# Patient Record
Sex: FEMALE | Race: WHITE | Employment: UNEMPLOYED | URBAN - METROPOLITAN AREA
[De-identification: names, ages, dates, MRNs, and addresses within clinical notes are randomized per-mention and may not be internally consistent; named-entity substitution may affect disease eponyms.]

---

## 2017-03-16 ENCOUNTER — DOCUMENTATION ONLY (OUTPATIENT)
Dept: FAMILY MEDICINE CLINIC | Age: 42
End: 2017-03-16

## 2017-04-17 DIAGNOSIS — E03.4 HYPOTHYROIDISM DUE TO ACQUIRED ATROPHY OF THYROID: ICD-10-CM

## 2017-04-17 DIAGNOSIS — E78.00 HYPERCHOLESTEREMIA: ICD-10-CM

## 2017-04-17 DIAGNOSIS — I10 ESSENTIAL HYPERTENSION WITH GOAL BLOOD PRESSURE LESS THAN 140/90: ICD-10-CM

## 2017-04-17 RX ORDER — SIMVASTATIN 40 MG/1
TABLET, FILM COATED ORAL
Qty: 30 TAB | Refills: 3 | Status: SHIPPED | OUTPATIENT
Start: 2017-04-17 | End: 2017-08-23 | Stop reason: SDUPTHER

## 2017-04-17 RX ORDER — LOSARTAN POTASSIUM 50 MG/1
TABLET ORAL
Qty: 30 TAB | Refills: 3 | Status: SHIPPED | OUTPATIENT
Start: 2017-04-17 | End: 2017-08-23 | Stop reason: SDUPTHER

## 2017-04-17 RX ORDER — LEVOTHYROXINE SODIUM 50 UG/1
TABLET ORAL
Qty: 30 TAB | Refills: 3 | Status: SHIPPED | OUTPATIENT
Start: 2017-04-17 | End: 2017-08-23 | Stop reason: SDUPTHER

## 2017-04-17 RX ORDER — HYDROCHLOROTHIAZIDE 25 MG/1
TABLET ORAL
Qty: 30 TAB | Refills: 3 | Status: SHIPPED | OUTPATIENT
Start: 2017-04-17 | End: 2017-08-23 | Stop reason: SDUPTHER

## 2017-05-12 RX ORDER — POLYETHYLENE GLYCOL 3350 17 G/17G
POWDER, FOR SOLUTION ORAL
Qty: 850 G | Refills: 3 | Status: SHIPPED | OUTPATIENT
Start: 2017-05-12 | End: 2017-08-23 | Stop reason: SDUPTHER

## 2017-06-16 DIAGNOSIS — E66.01 MORBID OBESITY DUE TO EXCESS CALORIES (HCC): ICD-10-CM

## 2017-06-16 RX ORDER — METFORMIN HYDROCHLORIDE 500 MG/1
TABLET ORAL
Qty: 60 TAB | Refills: 4 | Status: SHIPPED | OUTPATIENT
Start: 2017-06-16 | End: 2017-08-23 | Stop reason: SDUPTHER

## 2017-08-23 ENCOUNTER — HOSPITAL ENCOUNTER (OUTPATIENT)
Dept: LAB | Age: 42
Discharge: HOME | End: 2017-08-23
Payer: MEDICARE

## 2017-08-23 ENCOUNTER — OFFICE VISIT (OUTPATIENT)
Dept: FAMILY MEDICINE CLINIC | Age: 42
End: 2017-08-23

## 2017-08-23 VITALS
BODY MASS INDEX: 33.81 KG/M2 | SYSTOLIC BLOOD PRESSURE: 161 MMHG | WEIGHT: 215.4 LBS | DIASTOLIC BLOOD PRESSURE: 95 MMHG | HEIGHT: 67 IN | RESPIRATION RATE: 15 BRPM | TEMPERATURE: 99.9 F | HEART RATE: 115 BPM | OXYGEN SATURATION: 96 %

## 2017-08-23 DIAGNOSIS — D68.9 COAGULATION DEFECT (HCC): ICD-10-CM

## 2017-08-23 DIAGNOSIS — R21 RASH: ICD-10-CM

## 2017-08-23 DIAGNOSIS — E11.9 DM TYPE 2, GOAL HBA1C < 7% (HCC): Primary | ICD-10-CM

## 2017-08-23 DIAGNOSIS — I10 ESSENTIAL HYPERTENSION WITH GOAL BLOOD PRESSURE LESS THAN 140/90: ICD-10-CM

## 2017-08-23 DIAGNOSIS — E11.9 DM TYPE 2, GOAL HBA1C < 7% (HCC): ICD-10-CM

## 2017-08-23 DIAGNOSIS — E78.00 HYPERCHOLESTEREMIA: ICD-10-CM

## 2017-08-23 DIAGNOSIS — R58 BLEEDING: ICD-10-CM

## 2017-08-23 DIAGNOSIS — E03.4 HYPOTHYROIDISM DUE TO ACQUIRED ATROPHY OF THYROID: ICD-10-CM

## 2017-08-23 DIAGNOSIS — R23.3 EASY BRUISABILITY: ICD-10-CM

## 2017-08-23 DIAGNOSIS — Z12.39 BREAST CANCER SCREENING: ICD-10-CM

## 2017-08-23 DIAGNOSIS — L29.9 ITCHING: ICD-10-CM

## 2017-08-23 DIAGNOSIS — Z12.31 ENCOUNTER FOR SCREENING MAMMOGRAM FOR BREAST CANCER: ICD-10-CM

## 2017-08-23 DIAGNOSIS — I10 ESSENTIAL HYPERTENSION: ICD-10-CM

## 2017-08-23 DIAGNOSIS — K21.9 GASTROESOPHAGEAL REFLUX DISEASE WITHOUT ESOPHAGITIS: ICD-10-CM

## 2017-08-23 DIAGNOSIS — E87.6 HYPOKALEMIA: ICD-10-CM

## 2017-08-23 DIAGNOSIS — E66.01 MORBID OBESITY DUE TO EXCESS CALORIES (HCC): ICD-10-CM

## 2017-08-23 LAB
ALBUMIN SERPL-MCNC: 4.1 G/DL (ref 3.4–5)
ALBUMIN/GLOB SERPL: 1 {RATIO} (ref 0.8–1.7)
ALP SERPL-CCNC: 113 U/L (ref 45–117)
ALT SERPL-CCNC: 36 U/L (ref 13–56)
ANION GAP SERPL CALC-SCNC: 10 MMOL/L (ref 3–18)
APTT PPP: 27 SEC (ref 23–36.4)
AST SERPL-CCNC: 28 U/L (ref 15–37)
BASOPHILS # BLD: 0.1 K/UL (ref 0–0.06)
BASOPHILS NFR BLD: 0 % (ref 0–2)
BILIRUB SERPL-MCNC: 0.3 MG/DL (ref 0.2–1)
BUN SERPL-MCNC: 18 MG/DL (ref 7–18)
BUN/CREAT SERPL: 18 (ref 12–20)
CALCIUM SERPL-MCNC: 9.1 MG/DL (ref 8.5–10.1)
CHLORIDE SERPL-SCNC: 99 MMOL/L (ref 100–108)
CHOLEST SERPL-MCNC: 175 MG/DL
CO2 SERPL-SCNC: 29 MMOL/L (ref 21–32)
CREAT SERPL-MCNC: 1 MG/DL (ref 0.6–1.3)
DIFFERENTIAL METHOD BLD: ABNORMAL
EOSINOPHIL # BLD: 0.1 K/UL (ref 0–0.4)
EOSINOPHIL NFR BLD: 1 % (ref 0–5)
ERYTHROCYTE [DISTWIDTH] IN BLOOD BY AUTOMATED COUNT: 14.4 % (ref 11.6–14.5)
EST. AVERAGE GLUCOSE BLD GHB EST-MCNC: 126 MG/DL
GLOBULIN SER CALC-MCNC: 4.2 G/DL (ref 2–4)
GLUCOSE SERPL-MCNC: 107 MG/DL (ref 74–99)
HBA1C MFR BLD: 6 % (ref 4.2–5.6)
HCT VFR BLD AUTO: 43.3 % (ref 35–45)
HDLC SERPL-MCNC: 59 MG/DL (ref 40–60)
HDLC SERPL: 3 {RATIO} (ref 0–5)
HGB BLD-MCNC: 14.1 G/DL (ref 12–16)
INR PPP: 1 (ref 0.8–1.2)
LDLC SERPL CALC-MCNC: 84.8 MG/DL (ref 0–100)
LIPID PROFILE,FLP: ABNORMAL
LYMPHOCYTES # BLD: 5.2 K/UL (ref 0.9–3.6)
LYMPHOCYTES NFR BLD: 41 % (ref 21–52)
MCH RBC QN AUTO: 28.3 PG (ref 24–34)
MCHC RBC AUTO-ENTMCNC: 32.6 G/DL (ref 31–37)
MCV RBC AUTO: 86.8 FL (ref 74–97)
MONOCYTES # BLD: 1.3 K/UL (ref 0.05–1.2)
MONOCYTES NFR BLD: 10 % (ref 3–10)
NEUTS SEG # BLD: 6.2 K/UL (ref 1.8–8)
NEUTS SEG NFR BLD: 48 % (ref 40–73)
PLATELET # BLD AUTO: 292 K/UL (ref 135–420)
PMV BLD AUTO: 10.2 FL (ref 9.2–11.8)
POTASSIUM SERPL-SCNC: 3.4 MMOL/L (ref 3.5–5.5)
PROT SERPL-MCNC: 8.3 G/DL (ref 6.4–8.2)
PROTHROMBIN TIME: 12.4 SEC (ref 11.5–15.2)
RBC # BLD AUTO: 4.99 M/UL (ref 4.2–5.3)
SODIUM SERPL-SCNC: 138 MMOL/L (ref 136–145)
TRIGL SERPL-MCNC: 156 MG/DL (ref ?–150)
TSH SERPL DL<=0.05 MIU/L-ACNC: 2.17 UIU/ML (ref 0.36–3.74)
VLDLC SERPL CALC-MCNC: 31.2 MG/DL
WBC # BLD AUTO: 12.7 K/UL (ref 4.6–13.2)

## 2017-08-23 PROCEDURE — 80053 COMPREHEN METABOLIC PANEL: CPT | Performed by: INTERNAL MEDICINE

## 2017-08-23 PROCEDURE — 83036 HEMOGLOBIN GLYCOSYLATED A1C: CPT | Performed by: INTERNAL MEDICINE

## 2017-08-23 PROCEDURE — 80061 LIPID PANEL: CPT | Performed by: INTERNAL MEDICINE

## 2017-08-23 PROCEDURE — 85730 THROMBOPLASTIN TIME PARTIAL: CPT | Performed by: INTERNAL MEDICINE

## 2017-08-23 PROCEDURE — 36415 COLL VENOUS BLD VENIPUNCTURE: CPT | Performed by: INTERNAL MEDICINE

## 2017-08-23 PROCEDURE — 85610 PROTHROMBIN TIME: CPT | Performed by: INTERNAL MEDICINE

## 2017-08-23 PROCEDURE — 84443 ASSAY THYROID STIM HORMONE: CPT | Performed by: INTERNAL MEDICINE

## 2017-08-23 PROCEDURE — 86757 RICKETTSIA ANTIBODY: CPT | Performed by: INTERNAL MEDICINE

## 2017-08-23 PROCEDURE — 85025 COMPLETE CBC W/AUTO DIFF WBC: CPT | Performed by: INTERNAL MEDICINE

## 2017-08-23 RX ORDER — SIMVASTATIN 40 MG/1
40 TABLET, FILM COATED ORAL
Qty: 30 TAB | Refills: 3 | Status: SHIPPED | OUTPATIENT
Start: 2017-08-23 | End: 2017-12-13 | Stop reason: SDUPTHER

## 2017-08-23 RX ORDER — POTASSIUM CHLORIDE 750 MG/1
10 TABLET, EXTENDED RELEASE ORAL DAILY
Qty: 30 TAB | Refills: 0 | Status: SHIPPED | OUTPATIENT
Start: 2017-08-23 | End: 2017-08-25 | Stop reason: DRUGHIGH

## 2017-08-23 RX ORDER — POLYETHYLENE GLYCOL 3350 17 G/17G
17 POWDER, FOR SOLUTION ORAL DAILY
Qty: 850 G | Refills: 3 | Status: SHIPPED | OUTPATIENT
Start: 2017-08-23 | End: 2018-02-06 | Stop reason: SDUPTHER

## 2017-08-23 RX ORDER — HYDROCHLOROTHIAZIDE 25 MG/1
25 TABLET ORAL DAILY
Qty: 30 TAB | Refills: 3 | Status: SHIPPED | OUTPATIENT
Start: 2017-08-23 | End: 2017-12-13 | Stop reason: SDUPTHER

## 2017-08-23 RX ORDER — PREDNISONE 10 MG/1
TABLET ORAL
Qty: 21 TAB | Refills: 0 | Status: SHIPPED | OUTPATIENT
Start: 2017-08-23 | End: 2017-08-30 | Stop reason: SDUPTHER

## 2017-08-23 RX ORDER — PHENOL/SODIUM PHENOLATE
20 AEROSOL, SPRAY (ML) MUCOUS MEMBRANE 2 TIMES DAILY
Qty: 60 TAB | Refills: 3 | Status: SHIPPED | OUTPATIENT
Start: 2017-08-23 | End: 2018-02-06 | Stop reason: SDUPTHER

## 2017-08-23 RX ORDER — DOXYCYCLINE 100 MG/1
100 CAPSULE ORAL 2 TIMES DAILY
Qty: 20 CAP | Refills: 0 | Status: SHIPPED | OUTPATIENT
Start: 2017-08-23 | End: 2019-03-08

## 2017-08-23 RX ORDER — METFORMIN HYDROCHLORIDE 500 MG/1
500 TABLET ORAL 2 TIMES DAILY WITH MEALS
Qty: 60 TAB | Refills: 4 | Status: SHIPPED | OUTPATIENT
Start: 2017-08-23 | End: 2018-02-06 | Stop reason: SDUPTHER

## 2017-08-23 RX ORDER — HYDROXYZINE 25 MG/1
25 TABLET, FILM COATED ORAL
Qty: 30 TAB | Refills: 0 | Status: SHIPPED | OUTPATIENT
Start: 2017-08-23 | End: 2017-09-02

## 2017-08-23 RX ORDER — LEVOTHYROXINE SODIUM 50 UG/1
50 TABLET ORAL
Qty: 30 TAB | Refills: 3 | Status: SHIPPED | OUTPATIENT
Start: 2017-08-23 | End: 2017-12-13 | Stop reason: SDUPTHER

## 2017-08-23 RX ORDER — LOSARTAN POTASSIUM 50 MG/1
50 TABLET ORAL DAILY
Qty: 30 TAB | Refills: 3 | Status: SHIPPED | OUTPATIENT
Start: 2017-08-23 | End: 2017-09-12 | Stop reason: SDUPTHER

## 2017-08-23 NOTE — MR AVS SNAPSHOT
Visit Information Date & Time Provider Department Dept. Phone Encounter #  
 8/23/2017  2:45 PM Madhuri Montemayor, [address] [facility]  Follow-up Instructions Return in about 1 month (around 9/23/2017). Upcoming Health Maintenance Date Due Pneumococcal 19-64 Medium Risk (1 of 1 - PPSV23) 9/2/1993 INFLUENZA AGE 9 TO ADULT 8/1/2017 PAP AKA CERVICAL CYTOLOGY 8/12/2018 DTaP/Tdap/Td series (2 - Td) 7/1/2024 Allergies as of 8/23/2017  Review Complete On: 8/23/2017 By: Madhuri Montemayor MD  
  
 Severity Noted Reaction Type Reactions Methadone  11/30/2012    Other (comments) Hullucinations,spacy feeling Morphine  11/21/2011    Other (comments) Mood swings,suicidal thoughts Pcn [Penicillins]    Rash Tramadol  11/21/2011    Rash And suicidal thoughts Current Immunizations  Reviewed on 7/1/2014 Name Date Tdap 7/1/2014  2:22 PM  
  
 Not reviewed this visit You Were Diagnosed With   
  
 Codes Comments DM type 2, goal HbA1c < 7% (HCC)    -  Primary ICD-10-CM: E11.9 ICD-9-CM: 250.00 Hypothyroidism due to acquired atrophy of thyroid     ICD-10-CM: E03.4 ICD-9-CM: 244.8, 246.8 Hypercholesteremia     ICD-10-CM: E78.00 ICD-9-CM: 272.0 Essential hypertension     ICD-10-CM: I10 
ICD-9-CM: 401.9 Breast cancer screening     ICD-10-CM: Z12.39 
ICD-9-CM: V76.10 Encounter for screening mammogram for breast cancer     ICD-10-CM: Z12.31 
ICD-9-CM: V76.12 Morbid obesity due to excess calories (HCC)     ICD-10-CM: E66.01 
ICD-9-CM: 278.01 Essential hypertension with goal blood pressure less than 140/90     ICD-10-CM: I10 
ICD-9-CM: 401.9 Gastroesophageal reflux disease without esophagitis     ICD-10-CM: K21.9 ICD-9-CM: 530.81 Hypokalemia     ICD-10-CM: E87.6 ICD-9-CM: 276.8 Easy bruisability     ICD-10-CM: R23.8 ICD-9-CM: 782.9 Rash     ICD-10-CM: R21 
ICD-9-CM: 782.1 Itching     ICD-10-CM: L29.9 ICD-9-CM: 698.9 Bleeding     ICD-10-CM: R58 
ICD-9-CM: 459.0 Coagulation defect ([address]     ICD-10-CM: D68.9 ICD-9-CM: 286. 9 Vitals BP Pulse Temp Resp Height(growth percentile) Weight(growth percentile) (!) 161/95 (!) 115 99.9 °F (37.7 °C) (Oral) 15 5' 7\" (1.702 m) 215 lb 6.4 oz (97.7 kg) LMP SpO2 BMI OB Status Smoking Status 08/13/2017 96% 33.74 kg/m2 Having regular periods Current Every Day Smoker Vitals History BMI and BSA Data Body Mass Index Body Surface Area 33.74 kg/m 2 2.15 m 2 Preferred Pharmacy Pharmacy Name Phone [address] [address] [address] [place], [place] [phone] Your Updated Medication List  
  
   
This list is accurate as of: 8/23/17  3:46 PM.  Always use your most recent med list.  
  
  
  
  
 amitriptyline 150 mg tablet Commonly known as:  ELAVIL Take 150 mg by mouth nightly. doxycycline 100 mg capsule Commonly known as:  Arlyss Mantel Take 1 Cap by mouth two (2) times a day. ergocalciferol 50,000 unit capsule Commonly known as:  ERGOCALCIFEROL Take 1 Cap by mouth two (2) times a week for 30 days. Indications: VITAMIN D DEFICIENCY  
  
 fluticasone 50 mcg/actuation nasal spray Commonly known as:  Yecenia Yepez 2 Sprays by Both Nostrils route daily. * hydroCHLOROthiazide 25 mg tablet Commonly known as:  HYDRODIURIL Take 1 Tab by mouth daily for 30 days. Indications: HYPERTENSION  
  
 * hydroCHLOROthiazide 25 mg tablet Commonly known as:  HYDRODIURIL Take 1 Tab by mouth daily. * HYDROmorphone 8 mg tablet Commonly known as:  DILAUDID Take 8 mg by mouth every four (4) hours as needed for Pain. * HYDROmorphone 8 mg tablet Commonly known as:  DILAUDID Take 1.5 Tabs by mouth four (4) times daily as needed for Pain for 30 days. FILL ON OR AFTER: 10/18/13  Indications: PAIN  
  
 * HYDROmorphone 8 mg tablet Commonly known as:  DILAUDID Take 1.5 tablets by mouth four (4) times daily as needed for Pain for up to 30 days. Indications: PAIN  
  
 * HYDROmorphone 8 mg tablet Commonly known as:  DILAUDID Take 1.5 tablets by mouth four (4) times daily as needed for Pain for up to 30 days. Indications: PAIN  
  
 * HYDROmorphone 8 mg tablet Commonly known as:  DILAUDID Take 1.5 Tabs by mouth four (4) times daily as needed for Pain for up to 30 days. Indications: PAIN  
  
 * HYDROmorphone 8 mg tablet Commonly known as:  DILAUDID Take 1.5 Tabs by mouth four (4) times daily as needed for Pain for up to 30 days. Indications: PAIN  
  
 hydrOXYzine HCl 25 mg tablet Commonly known as:  ATARAX Take 1 Tab by mouth three (3) times daily as needed for Itching for up to 10 days. KlonoPIN 1 mg tablet Generic drug:  clonazePAM  
Take 1 mg by mouth four (4) times daily. LaMICtal 200 mg tablet Generic drug:  lamoTRIgine Take 200 mg by mouth daily. levothyroxine 50 mcg tablet Commonly known as:  SYNTHROID Take 1 Tab by mouth Daily (before breakfast). LINZESS 145 mcg Cap capsule Generic drug:  linaclotide Take  by mouth Daily (before breakfast). lorcaserin 10 mg Tab Commonly known as:  [place] Feast Take 10 mg by mouth two (2) times a day. Max Daily Amount: 20 mg.  
  
 losartan 50 mg tablet Commonly known as:  COZAAR Take 1 Tab by mouth daily. mefenamic acid 250 mg capsule Commonly known as:  PONSTEL Take 1 Cap by mouth every six (6) hours. metFORMIN 500 mg tablet Commonly known as:  GLUCOPHAGE Take 1 Tab by mouth two (2) times daily (with meals). Omeprazole delayed release 20 mg tablet Commonly known as:  PRILOSEC D/R Take 1 Tab by mouth two (2) times a day. * oxyCODONE CR 40 mg CR tablet Commonly known as:  OxyCONTIN Take 1 Tab by mouth every six (6) hours for 30 days.  For chronic pain Fill on or after: 4/11/14  Indications: NEUROPATHIC PAIN, PAIN  
  
 * oxyCODONE CR 40 mg CR tablet Commonly known as:  OxyCONTIN Take 1 Tab by mouth every six (6) hours for 30 days. For chronic pain Fill on or after: 5/9/14  Indications: NEUROPATHIC PAIN, PAIN  
  
 * oxyCODONE CR 40 mg CR tablet Commonly known as:  OxyCONTIN Take 1 Tab by mouth every six (6) hours for 30 days. For chronic pain Fill on or after: 6/7/14  Indications: NEUROPATHIC PAIN, PAIN  
  
 * oxyCODONE CR 40 mg CR tablet Commonly known as:  OxyCONTIN Take 1 Tab by mouth every six (6) hours for 30 days. For chronic pain Fill on or after: 7/5/14  Indications: CHRONIC PAIN, SEVERE PAIN  
  
 * oxyCODONE CR 40 mg CR tablet Commonly known as:  OxyCONTIN Take 1 Tab by mouth every six (6) hours for 30 days. For chronic pain Fill on or after: 8/2/14  Indications: CHRONIC PAIN, SEVERE PAIN  
  
 * oxyCODONE CR 40 mg CR tablet Commonly known as:  OxyCONTIN Take 1 Tab by mouth every six (6) hours for 30 days. For chronic pain Fill on or after: 9/1/14  Indications: CHRONIC PAIN, SEVERE PAIN  
  
 * oxyCODONE CR 60 mg CR tablet Commonly known as:  OxyCONTIN Take 1 tablet by mouth every six (6) hours for 30 days. Indications: CHRONIC PAIN WITH OPIOID TOLERANCE, SEVERE PAIN WITH OPIOID TOLERANCE  
  
 * oxyCODONE CR 60 mg CR tablet Commonly known as:  OxyCONTIN Take 1 tablet by mouth every six (6) hours for 30 days. For chronic pain  Indications: CHRONIC PAIN WITH OPIOID TOLERANCE, SEVERE PAIN WITH OPIOID TOLERANCE  
  
 * oxyCODONE CR 60 mg CR tablet Commonly known as:  OxyCONTIN Take 1 tablet by mouth every six (6) hours for 30 days. Indications: CHRONIC PAIN WITH OPIOID TOLERANCE, SEVERE PAIN WITH OPIOID TOLERANCE  
  
 * oxyCODONE IR 30 mg immediate release tablet Commonly known as:  OXY-IR Take 1 Tab by mouth four (4) times daily as needed for Pain. Max Daily Amount: 120 mg.  
  
 * oxyCODONE CR 60 mg CR tablet Commonly known as:  OxyCONTIN Take 1 Tab by mouth every six (6) hours for 30 days. Indications: CHRONIC PAIN WITH OPIOID TOLERANCE, SEVERE PAIN WITH OPIOID TOLERANCE  
  
 * oxyCODONE IR 15 mg immediate release tablet Commonly known as:  OXY-IR  
  
 * OxyCONTIN 20 mg ER tablet Generic drug:  oxyCODONE ER  
  
 polyethylene glycol 17 gram/dose powder Commonly known as:  Almeta Lauth Take 17 g by mouth daily. potassium chloride 10 mEq tablet Commonly known as:  KLOR-CON Take 1 Tab by mouth daily. predniSONE 10 mg dose pack Commonly known as:  STERAPRED DS See administration instruction per 10mg dose pack  
  
 simvastatin 40 mg tablet Commonly known as:  ZOCOR Take 1 Tab by mouth nightly. SUMAtriptan 50 mg tablet Commonly known as:  IMITREX Take 1 Tab by mouth once as needed for Migraine for up to 1 dose. TRAZODONE PO Take 30 mg by mouth nightly as needed. WELLBUTRIN  mg SR tablet Generic drug:  buPROPion SR Take 400 mg by mouth daily. * Notice: This list has 21 medication(s) that are the same as other medications prescribed for you. Read the directions carefully, and ask your doctor or other care provider to review them with you. Prescriptions Sent to Pharmacy Refills  
 metFORMIN (GLUCOPHAGE) 500 mg tablet 4 Sig: Take 1 Tab by mouth two (2) times daily (with meals). Class: Normal  
 Pharmacy: [address] [place] [address] Ph #: [phone] Route: Oral  
 levothyroxine (SYNTHROID) 50 mcg tablet 3 Sig: Take 1 Tab by mouth Daily (before breakfast). Class: Normal  
 Pharmacy: [address] [place] [address] Ph #: [phone] Route: Oral  
 losartan (COZAAR) 50 mg tablet 3 Sig: Take 1 Tab by mouth daily. Class: Normal  
 Pharmacy: [address] [place] [address] Ph #: [phone] Route: Oral  
 Omeprazole delayed release (PRILOSEC D/R) 20 mg tablet 3 Sig: Take 1 Tab by mouth two (2) times a day. Class: Normal  
 Pharmacy: [address] [place] [address] Ph #: [phone] Route: Oral  
 polyethylene glycol (MIRALAX) 17 gram/dose powder 3 Sig: Take 17 g by mouth daily. Class: Normal  
 Pharmacy: [address] [place] [address] Ph #: [phone] Route: Oral  
 hydroCHLOROthiazide (HYDRODIURIL) 25 mg tablet 3 Sig: Take 1 Tab by mouth daily. Class: Normal  
 Pharmacy: [address] [place] [address] Ph #: [phone] Route: Oral  
 simvastatin (ZOCOR) 40 mg tablet 3 Sig: Take 1 Tab by mouth nightly. Class: Normal  
 Pharmacy: [address] [place] [address] Ph #: [phone] Route: Oral  
 potassium chloride (KLOR-CON) 10 mEq tablet 0 Sig: Take 1 Tab by mouth daily. Class: Normal  
 Pharmacy: [address] [place] [address] Ph #: [phone] Route: Oral  
 hydrOXYzine HCl (ATARAX) 25 mg tablet 0 Sig: Take 1 Tab by mouth three (3) times daily as needed for Itching for up to 10 days. Class: Normal  
 Pharmacy: [address] [place] [address] Ph #: [phone] Route: Oral  
 predniSONE (STERAPRED DS) 10 mg dose pack 0 Sig: See administration instruction per 10mg dose pack Class: Normal  
 Pharmacy: [address] [place] [address] Ph #: [phone]  
 doxycycline (MONODOX) 100 mg capsule 0 Sig: Take 1 Cap by mouth two (2) times a day. Class: Normal  
 Pharmacy: [address] [place] [address] Ph #: [phone] Route: Oral  
  
Follow-up Instructions Return in about 1 month (around 9/23/2017). To-Do List   
 08/23/2017 Lab:  CBC WITH AUTOMATED DIFF   
  
 08/23/2017 Lab:  HEMOGLOBIN A1C WITH EAG   
  
 08/23/2017 Lab:  LIPID PANEL   
  
 08/23/2017 Imaging:  BENJI MAMMO BI SCREENING INCL CAD   
  
 08/23/2017 Lab:  METABOLIC PANEL, COMPREHENSIVE   
  
 08/23/2017 Lab:  MICROALBUMIN, UR, RAND W/ MICROALBUMIN/CREA RATIO   
  
 08/23/2017 Lab:  PROTHROMBIN TIME + INR   
  
 08/23/2017 Lab:  PTT   
  
 08/23/2017 Lab:  R RICKETTSII AB IGG W/REFL   
  
 08/23/2017 Lab:  TSH 3RD GENERATION Introducing [facility]! [employer] introduces [employer] patient portal. Now you can access parts of your medical record, email your doctor's office, and request medication refills online. 1. In your internet browser, go to [email or web address] 2. Click on the First Time User? Click Here link in the Sign In box. You will see the New Member Sign Up page. 3. Enter your [employer] Access Code exactly as it appears below. You will not need to use this code after youve completed the sign-up process. If you do not sign up before the expiration date, you must request a new code. · [employer] Access Code: [email or web address] Expires: 11/21/2017  2:43 PM 
 
4. Enter the last four digits of your Social Security Number (xxxx) and Date of Birth (mm/dd/yyyy) as indicated and click Submit. You will be taken to the next sign-up page. 5. Create a [employer] ID. This will be your [employer] login ID and cannot be changed, so think of one that is secure and easy to remember. 6. Create a [employer] password. You can change your password at any time. 7. Enter your Password Reset Question and Answer. This can be used at a later time if you forget your password. 8. Enter your e-mail address. You will receive e-mail notification when new information is available in [address] 9. Click Sign Up. You can now view and download portions of your medical record. 10. Click the Download Summary menu link to download a portable copy of your medical information. If you have questions, please visit the Frequently Asked Questions section of the [employer] website. Remember, [employer] is NOT to be used for urgent needs. For medical emergencies, dial 911. Now available from your iPhone and Android! Please provide this summary of care documentation to your next provider. Your primary care clinician is listed as Charles Garcia. If you have any questions after today's visit, please call [phone].

## 2017-08-23 NOTE — PROGRESS NOTES
Mika Candelaria is a 42 y.o.  female and presents with     Chief Complaint   Patient presents with    Hypertension    Hypothyroidism    Diabetes    Fever    Rash    Itching     Pt is in process of moving. She tried some clothing out of a box. She broke out in a rash on arms and legs. Pt went to [facility] urgent care. Pt was treated for scabies and also given prednisone. Pt says the rash appeared abrubtly overnight. Pt has low grade fever of 99. Pt denies urinary symptoms, cough or dental issues, no abdominal pain. Pt has been trying to loose wt. Pt has lost 4 pounds. Pt bruises easily      Past Medical History:   Diagnosis Date    ADD (attention deficit disorder)     Chronic pain     back    DDD (degenerative disc disease)     Depression     Hypercholesterolemia     Hypertension     Psychotic disorder     Reflex sympathetic dystrophy of the lower limb 7/27/2011    RSD (reflex sympathetic dystrophy)     RSD (reflex sympathetic dystrophy)     Sacralization     L5    Sprained ankle     Thyroid disease      No past surgical history on file. Current Outpatient Prescriptions   Medication Sig    metFORMIN (GLUCOPHAGE) 500 mg tablet Take 1 Tab by mouth two (2) times daily (with meals).  levothyroxine (SYNTHROID) 50 mcg tablet Take 1 Tab by mouth Daily (before breakfast).  losartan (COZAAR) 50 mg tablet Take 1 Tab by mouth daily.  Omeprazole delayed release (PRILOSEC D/R) 20 mg tablet Take 1 Tab by mouth two (2) times a day.  polyethylene glycol (MIRALAX) 17 gram/dose powder Take 17 g by mouth daily.  hydroCHLOROthiazide (HYDRODIURIL) 25 mg tablet Take 1 Tab by mouth daily.  simvastatin (ZOCOR) 40 mg tablet Take 1 Tab by mouth nightly.  potassium chloride (KLOR-CON) 10 mEq tablet Take 1 Tab by mouth daily.  hydrOXYzine HCl (ATARAX) 25 mg tablet Take 1 Tab by mouth three (3) times daily as needed for Itching for up to 10 days.     predniSONE (STERAPRED DS) 10 mg dose pack See administration instruction per 10mg dose pack    doxycycline (MONODOX) 100 mg capsule Take 1 Cap by mouth two (2) times a day.  HYDROmorphone (DILAUDID) 8 mg tablet Take 8 mg by mouth every four (4) hours as needed for Pain.  linaclotide (LINZESS) 145 mcg cap capsule Take  by mouth Daily (before breakfast).  fluticasone (FLONASE) 50 mcg/actuation nasal spray 2 Sprays by Both Nostrils route daily.  mefenamic acid (PONSTEL) 250 mg capsule Take 1 Cap by mouth every six (6) hours.  oxyCODONE IR (OXY-IR) 15 mg immediate release tablet     OXYCONTIN 20 mg ER tablet     hydrochlorothiazide (HYDRODIURIL) 25 mg tablet Take 1 Tab by mouth daily for 30 days. Indications: HYPERTENSION    lorcaserin (BELVIQ) 10 mg tab Take 10 mg by mouth two (2) times a day. Max Daily Amount: 20 mg.    oxyCODONE IR (OXY-IR) 30 mg immediate release tablet Take 1 Tab by mouth four (4) times daily as needed for Pain. Max Daily Amount: 120 mg.    oxyCODONE CR (OXYCONTIN) 60 mg CR tablet Take 1 Tab by mouth every six (6) hours for 30 days. Indications: CHRONIC PAIN WITH OPIOID TOLERANCE, SEVERE PAIN WITH OPIOID TOLERANCE    HYDROmorphone (DILAUDID) 8 mg tablet Take 1.5 Tabs by mouth four (4) times daily as needed for Pain for up to 30 days. Indications: PAIN    HYDROmorphone (DILAUDID) 8 mg tablet Take 1.5 Tabs by mouth four (4) times daily as needed for Pain for up to 30 days. Indications: PAIN    SUMAtriptan (IMITREX) 50 mg tablet Take 1 Tab by mouth once as needed for Migraine for up to 1 dose.  HYDROmorphone (DILAUDID) 8 mg tablet Take 1.5 tablets by mouth four (4) times daily as needed for Pain for up to 30 days. Indications: PAIN    HYDROmorphone (DILAUDID) 8 mg tablet Take 1.5 tablets by mouth four (4) times daily as needed for Pain for up to 30 days. Indications: PAIN    oxyCODONE CR (OXYCONTIN) 60 mg CR tablet Take 1 tablet by mouth every six (6) hours for 30 days.  Indications: CHRONIC PAIN WITH OPIOID TOLERANCE, SEVERE PAIN WITH OPIOID TOLERANCE    oxyCODONE CR (OXYCONTIN) 60 mg CR tablet Take 1 tablet by mouth every six (6) hours for 30 days. For chronic pain  Indications: CHRONIC PAIN WITH OPIOID TOLERANCE, SEVERE PAIN WITH OPIOID TOLERANCE    oxyCODONE CR (OXYCONTIN) 60 mg CR tablet Take 1 tablet by mouth every six (6) hours for 30 days. Indications: CHRONIC PAIN WITH OPIOID TOLERANCE, SEVERE PAIN WITH OPIOID TOLERANCE    oxyCODONE CR (OXYCONTIN) 40 mg CR tablet Take 1 Tab by mouth every six (6) hours for 30 days. For chronic pain  Fill on or after:  9/1/14  Indications: CHRONIC PAIN, SEVERE PAIN    oxyCODONE CR (OXYCONTIN) 40 mg CR tablet Take 1 Tab by mouth every six (6) hours for 30 days. For chronic pain  Fill on or after:  8/2/14  Indications: CHRONIC PAIN, SEVERE PAIN    oxyCODONE CR (OXYCONTIN) 40 mg CR tablet Take 1 Tab by mouth every six (6) hours for 30 days. For chronic pain  Fill on or after:  7/5/14  Indications: CHRONIC PAIN, SEVERE PAIN    oxyCODONE CR (OXYCONTIN) 40 mg CR tablet Take 1 Tab by mouth every six (6) hours for 30 days. For chronic pain  Fill on or after:  6/7/14  Indications: NEUROPATHIC PAIN, PAIN    oxyCODONE CR (OXYCONTIN) 40 mg CR tablet Take 1 Tab by mouth every six (6) hours for 30 days. For chronic pain  Fill on or after:  5/9/14  Indications: NEUROPATHIC PAIN, PAIN    oxyCODONE CR (OXYCONTIN) 40 mg CR tablet Take 1 Tab by mouth every six (6) hours for 30 days. For chronic pain  Fill on or after:  4/11/14  Indications: NEUROPATHIC PAIN, PAIN    amitriptyline (ELAVIL) 150 mg tablet Take 150 mg by mouth nightly.  HYDROmorphone (DILAUDID) 8 mg tablet Take 1.5 Tabs by mouth four (4) times daily as needed for Pain for 30 days. FILL ON OR AFTER:  10/18/13  Indications: PAIN    ergocalciferol (ERGOCALCIFEROL) 50,000 unit capsule Take 1 Cap by mouth two (2) times a week for 30 days.  Indications: VITAMIN D DEFICIENCY    TRAZODONE HCL (TRAZODONE PO) Take 30 mg by mouth nightly as needed.  buPROPion SR (WELLBUTRIN SR) 200 mg SR tablet Take 400 mg by mouth daily.  clonazepam (KLONOPIN) 1 mg tablet Take 1 mg by mouth four (4) times daily.  lamotrigine (LAMICTAL) 200 mg tablet Take 200 mg by mouth daily. No current facility-administered medications for this visit. Health Maintenance   Topic Date Due    Pneumococcal 19-64 Medium Risk (1 of 1 - PPSV23) 09/02/1993    INFLUENZA AGE 9 TO ADULT  08/01/2017    PAP AKA CERVICAL CYTOLOGY  08/12/2018    DTaP/Tdap/Td series (2 - Td) 07/01/2024     Immunization History   Administered Date(s) Administered    Tdap 07/01/2014     Patient's last menstrual period was 08/13/2017. Allergies and Intolerances: Allergies   Allergen Reactions    Methadone Other (comments)     Hullucinations,spacy feeling      Morphine Other (comments)     Mood swings,suicidal thoughts    Pcn [Penicillins] Rash    Tramadol Rash     And suicidal thoughts       Family History:   Family History   Problem Relation Age of Onset    Diabetes Mother     Heart Disease Father     Stroke Father     Breast Cancer Paternal Grandmother        Social History:   She  reports that she has been smoking Cigarettes. She has a 6.90 pack-year smoking history. She has never used smokeless tobacco.  She  reports that she does not drink alcohol.             Review of Systems:   General: negative for - chills, fatigue, fever, weight change  Psych: negative for - anxiety, depression, irritability or mood swings  ENT: negative for - headaches, hearing change, nasal congestion, oral lesions, sneezing or sore throat  Heme/ Lymph: negative for - bleeding problems, bruising, pallor or swollen lymph nodes  Endo: negative for - hot flashes, polydipsia/polyuria or temperature intolerance  Resp: negative for - cough, shortness of breath or wheezing  CV: negative for - chest pain, edema or palpitations  GI: negative for - abdominal pain, change in bowel habits, constipation, diarrhea or nausea/vomiting  : negative for - dysuria, hematuria, incontinence, pelvic pain or vulvar/vaginal symptoms  MSK: negative for - joint pain, joint swelling or muscle pain  Neuro: negative for - confusion, headaches, seizures or weakness  Derm: negative for - dry skin, hair changes, rash or skin lesion changes, pos for rash on arms          Physical:   Vitals:   Vitals:    08/23/17 1456 08/23/17 1503   BP: (!) 143/93 (!) 161/95   Pulse: (!) 115    Resp: 15    Temp: 99.9 °F (37.7 °C)    TempSrc: Oral    SpO2: 96%    Weight: 215 lb 6.4 oz (97.7 kg)    Height: 5' 7\" (1.702 m)            Exam:   HEENT- atraumatic,normocephalic, awake, oriented, well nourished  Neck - supple,no enlarged lymph nodes, no JVD, no thyromegaly  Chest- CTA, no rhonchi, no crackles  Heart- rrr, no murmurs / gallop/rub  Abdomen- soft,BS+,NT, no hepatosplenomegaly  Ext - no c/c/edema   Neuro- no focal deficits. Power 5/5 all extremities  Skin - warm,dry, no obvious rashes, pinpoint lesions on arms and legs more distally, small purpuric lesions on arms,           Review of Data:   LABS:   Lab Results   Component Value Date/Time    WBC 11.3 12/12/2016 04:10 PM    HGB 13.5 12/12/2016 04:10 PM    HCT 40.7 12/12/2016 04:10 PM    PLATELET 549 93/65/4294 04:10 PM     Lab Results   Component Value Date/Time    Sodium 136 12/12/2016 04:10 PM    Potassium 3.3 12/12/2016 04:10 PM    Chloride 97 12/12/2016 04:10 PM    CO2 31 12/12/2016 04:10 PM    Glucose 80 12/12/2016 04:10 PM    BUN 9 12/12/2016 04:10 PM    Creatinine 0.85 12/12/2016 04:10 PM     Lab Results   Component Value Date/Time    Cholesterol, total 193 12/12/2016 04:10 PM    HDL Cholesterol 53 12/12/2016 04:10 PM    LDL, calculated 111.4 12/12/2016 04:10 PM    Triglyceride 143 12/12/2016 04:10 PM     No results found for: GPT        Impression / Plan:        ICD-10-CM ICD-9-CM    1. DM type 2, goal HbA1c < 7% ([facility]) E11.9 250.00 HEMOGLOBIN A1C WITH EAG      MICROALBUMIN, UR, RAND W/ MICROALBUMIN/CREA RATIO      METABOLIC PANEL, COMPREHENSIVE      CBC WITH AUTOMATED DIFF   2. Hypothyroidism due to acquired atrophy of thyroid E03.4 244.8 TSH 3RD GENERATION     246.8 levothyroxine (SYNTHROID) 50 mcg tablet   3. Hypercholesteremia E78.00 272.0 LIPID PANEL      hydroCHLOROthiazide (HYDRODIURIL) 25 mg tablet      simvastatin (ZOCOR) 40 mg tablet   4. Essential hypertension I10 401.9    5. Breast cancer screening Z12.39 V76.10 [facility] MAMMO BI SCREENING INCL CAD   6. Encounter for screening mammogram for breast cancer Z12.31 V76.12 [facility] MAMMO BI SCREENING INCL CAD   7. Morbid obesity due to excess calories (HCC) E66.01 278.01 metFORMIN (GLUCOPHAGE) 500 mg tablet   8. Essential hypertension with goal blood pressure less than 140/90 I10 401.9 losartan (COZAAR) 50 mg tablet   9. Gastroesophageal reflux disease without esophagitis K21.9 530.81 Omeprazole delayed release (PRILOSEC D/R) 20 mg tablet   10. Hypokalemia E87.6 276.8 potassium chloride (KLOR-CON) 10 mEq tablet   11. Easy bruisability R23.8 782.9 PROTHROMBIN TIME + INR      PTT   12. Rash R21 782. 1 predniSONE (STERAPRED DS) 10 mg dose pack      doxycycline (MONODOX) 100 mg capsule      R RICKETTSII AB IGG W/REFL   13. Itching L29.9 698.9 hydrOXYzine HCl (ATARAX) 25 mg tablet   14. Bleeding R58 459.0 PROTHROMBIN TIME + INR      PTT   15. Coagulation defect (HCC) D68.9 286.9 PTT     Low grade fever, rash - will empirically treat for ricketssial infeciton. Explained to patient risk benefits of the medications. Advised patient to stop meds if having any side effects. Pt verbalized understanding of the instructions. I have discussed the diagnosis with the patient and the intended plan as seen in the above orders. The patient has received an after-visit summary and questions were answered concerning future plans. I have discussed medication side effects and warnings with the patient as well.  I have reviewed the plan of care with the patient, accepted their input and they are in agreement with the treatment goals. Reviewed plan of care. Patient has provided input and agrees with goals.     Follow-up Disposition: Not on Arnold Herrera MD

## 2017-08-23 NOTE — PROGRESS NOTES
1. Have you been to the ER, urgent care clinic since your last visit? Hospitalized since your last visit? 8/20/17 [facility]    2. Have you seen or consulted any other health care providers outside of the [address] [place] [address] since your last visit? Include any pap smears or colon screening.  No

## 2017-08-24 LAB — R RICKETTSI IGG SER QL IA: NEGATIVE

## 2017-08-25 ENCOUNTER — TELEPHONE (OUTPATIENT)
Dept: FAMILY MEDICINE CLINIC | Age: 42
End: 2017-08-25

## 2017-08-25 DIAGNOSIS — E87.6 HYPOKALEMIA: Primary | ICD-10-CM

## 2017-08-25 RX ORDER — POTASSIUM CHLORIDE 20 MEQ/1
20 TABLET, EXTENDED RELEASE ORAL DAILY
Qty: 30 TAB | Refills: 3 | Status: SHIPPED | OUTPATIENT
Start: 2017-08-25 | End: 2018-02-06 | Stop reason: SDUPTHER

## 2017-08-25 NOTE — TELEPHONE ENCOUNTER
Per PCP \"Will increase dose of Kdur to 20 meq po daily. K is low. \"    I tried calling ms castaneda but she didn't answer. I left her a msg to call me back in regards to labs.

## 2017-08-28 NOTE — TELEPHONE ENCOUNTER
Pt returned my call. I informed her K was low. She states she already picked up the potassium from pharmacy, and will start taking today. She also wanted to inform Dr. Nica Reilly that bites were from black  spiders.

## 2017-08-30 ENCOUNTER — OFFICE VISIT (OUTPATIENT)
Dept: FAMILY MEDICINE CLINIC | Age: 42
End: 2017-08-30

## 2017-08-30 VITALS
HEIGHT: 67 IN | BODY MASS INDEX: 34.47 KG/M2 | TEMPERATURE: 98.8 F | HEART RATE: 104 BPM | SYSTOLIC BLOOD PRESSURE: 160 MMHG | RESPIRATION RATE: 20 BRPM | OXYGEN SATURATION: 96 % | DIASTOLIC BLOOD PRESSURE: 90 MMHG | WEIGHT: 219.6 LBS
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
## 2017-08-30 DIAGNOSIS — R21 RASH: Primary | ICD-10-CM

## 2017-08-30 RX ORDER — PREDNISONE 10 MG/1
TABLET ORAL
Qty: 21 TAB | Refills: 0 | Status: SHIPPED | OUTPATIENT
Start: 2017-08-30 | End: 2019-03-08

## 2017-08-30 NOTE — MR AVS SNAPSHOT
Visit Information Date & Time Provider Department Dept. Phone Encounter #  
 8/30/2017 10:15 AM Franky Campbell, [address] [facility] [phone] [id number] Follow-up Instructions Return if symptoms worsen or fail to improve. Your Appointments 9/25/2017 11:00 AM  
Follow Up with [id number] [address] Milind Churchill MD  
[facility]) Appt Note: Return in 1 month for F/U  
 [address] [facility] [address]  
  
   
 [address] Upcoming Health Maintenance Date Due  
 FOOT EXAM Q1 9/2/1984 MICROALBUMIN Q1 9/2/1984 EYE EXAM RETINAL OR DILATED Q1 9/2/1984 Pneumococcal 19-64 Medium Risk (1 of 1 - PPSV23) 9/2/1993 INFLUENZA AGE 9 TO ADULT 8/1/2017 HEMOGLOBIN A1C Q6M 2/23/2018 PAP AKA CERVICAL CYTOLOGY 8/12/2018 LIPID PANEL Q1 8/23/2018 DTaP/Tdap/Td series (2 - Td) 7/1/2024 Allergies as of 8/30/2017  Review Complete On: 8/30/2017 By: Ella Nixon LPN Severity Noted Reaction Type Reactions Methadone  11/30/2012    Other (comments) Hullucinations,spacy feeling Morphine  11/21/2011    Other (comments) Mood swings,suicidal thoughts Pcn [Penicillins]    Rash Tramadol  11/21/2011    Rash And suicidal thoughts Current Immunizations  Reviewed on 7/1/2014 Name Date Tdap 7/1/2014  2:22 PM  
  
 Not reviewed this visit You Were Diagnosed With   
  
 Codes Comments Rash    -  Primary ICD-10-CM: R21 
ICD-9-CM: 782.1 Insect bite, subsequent encounter     ICD-10-CM: W57. Katy Morin ICD-9-CM: E906.4 Vitals BP Pulse Temp Resp Height(growth percentile) Weight(growth percentile) 160/90 (!) 104 98.8 °F (37.1 °C) (Oral) 20 5' 7\" (1.702 m) 219 lb 9.6 oz (99.6 kg) LMP SpO2 BMI OB Status Smoking Status 08/13/2017 96% 34.39 kg/m2 Having regular periods Current Every Day Smoker BMI and BSA Data Body Mass Index Body Surface Area  
 34.39 kg/m 2 2.17 m 2 Preferred Pharmacy Pharmacy Name Phone [address] [facility], [address] [address] [place] [address] [address] [facility] [phone] Your Updated Medication List  
  
   
This list is accurate as of: 8/30/17 11:39 AM.  Always use your most recent med list.  
  
  
  
  
 amitriptyline 150 mg tablet Commonly known as:  ELAVIL Take 150 mg by mouth nightly. doxycycline 100 mg capsule Commonly known as:  Marolyn Mort Take 1 Cap by mouth two (2) times a day. ergocalciferol 50,000 unit capsule Commonly known as:  ERGOCALCIFEROL Take 1 Cap by mouth two (2) times a week for 30 days. Indications: VITAMIN D DEFICIENCY  
  
 fluticasone 50 mcg/actuation nasal spray Commonly known as:  Francella Lacks 2 Sprays by Both Nostrils route daily. * hydroCHLOROthiazide 25 mg tablet Commonly known as:  HYDRODIURIL Take 1 Tab by mouth daily for 30 days. Indications: HYPERTENSION  
  
 * hydroCHLOROthiazide 25 mg tablet Commonly known as:  HYDRODIURIL Take 1 Tab by mouth daily. * HYDROmorphone 8 mg tablet Commonly known as:  DILAUDID Take 8 mg by mouth every four (4) hours as needed for Pain. * HYDROmorphone 8 mg tablet Commonly known as:  DILAUDID Take 1.5 Tabs by mouth four (4) times daily as needed for Pain for 30 days. FILL ON OR AFTER: 10/18/13  Indications: PAIN  
  
 * HYDROmorphone 8 mg tablet Commonly known as:  DILAUDID Take 1.5 tablets by mouth four (4) times daily as needed for Pain for up to 30 days. Indications: PAIN  
  
 * HYDROmorphone 8 mg tablet Commonly known as:  DILAUDID Take 1.5 tablets by mouth four (4) times daily as needed for Pain for up to 30 days. Indications: PAIN  
  
 * HYDROmorphone 8 mg tablet Commonly known as:  DILAUDID Take 1.5 Tabs by mouth four (4) times daily as needed for Pain for up to 30 days. Indications: PAIN  
  
 * HYDROmorphone 8 mg tablet Commonly known as:  DILAUDID Take 1.5 Tabs by mouth four (4) times daily as needed for Pain for up to 30 days. Indications: PAIN  
  
 hydrOXYzine HCl 25 mg tablet Commonly known as:  ATARAX Take 1 Tab by mouth three (3) times daily as needed for Itching for up to 10 days. KlonoPIN 1 mg tablet Generic drug:  clonazePAM  
Take 1 mg by mouth four (4) times daily. LaMICtal 200 mg tablet Generic drug:  lamoTRIgine Take 200 mg by mouth daily. levothyroxine 50 mcg tablet Commonly known as:  SYNTHROID Take 1 Tab by mouth Daily (before breakfast). LINZESS 145 mcg Cap capsule Generic drug:  linaclotide Take  by mouth Daily (before breakfast). lorcaserin 10 mg Tab Commonly known as:  Gerald Rode Take 10 mg by mouth two (2) times a day. Max Daily Amount: 20 mg.  
  
 losartan 50 mg tablet Commonly known as:  COZAAR Take 1 Tab by mouth daily. mefenamic acid 250 mg capsule Commonly known as:  PONSTEL Take 1 Cap by mouth every six (6) hours. metFORMIN 500 mg tablet Commonly known as:  GLUCOPHAGE Take 1 Tab by mouth two (2) times daily (with meals). Omeprazole delayed release 20 mg tablet Commonly known as:  PRILOSEC D/R Take 1 Tab by mouth two (2) times a day. * oxyCODONE CR 40 mg CR tablet Commonly known as:  OxyCONTIN Take 1 Tab by mouth every six (6) hours for 30 days. For chronic pain Fill on or after: 4/11/14  Indications: NEUROPATHIC PAIN, PAIN  
  
 * oxyCODONE CR 40 mg CR tablet Commonly known as:  OxyCONTIN Take 1 Tab by mouth every six (6) hours for 30 days. For chronic pain Fill on or after: 5/9/14  Indications: NEUROPATHIC PAIN, PAIN  
  
 * oxyCODONE CR 40 mg CR tablet Commonly known as:  OxyCONTIN Take 1 Tab by mouth every six (6) hours for 30 days. For chronic pain Fill on or after: 6/7/14  Indications: NEUROPATHIC PAIN, PAIN  
  
 * oxyCODONE CR 40 mg CR tablet Commonly known as:  OxyCONTIN  
 Take 1 Tab by mouth every six (6) hours for 30 days. For chronic pain Fill on or after: 7/5/14  Indications: CHRONIC PAIN, SEVERE PAIN  
  
 * oxyCODONE CR 40 mg CR tablet Commonly known as:  OxyCONTIN Take 1 Tab by mouth every six (6) hours for 30 days. For chronic pain Fill on or after: 8/2/14  Indications: CHRONIC PAIN, SEVERE PAIN  
  
 * oxyCODONE CR 40 mg CR tablet Commonly known as:  OxyCONTIN Take 1 Tab by mouth every six (6) hours for 30 days. For chronic pain Fill on or after: 9/1/14  Indications: CHRONIC PAIN, SEVERE PAIN  
  
 * oxyCODONE CR 60 mg CR tablet Commonly known as:  OxyCONTIN Take 1 tablet by mouth every six (6) hours for 30 days. Indications: CHRONIC PAIN WITH OPIOID TOLERANCE, SEVERE PAIN WITH OPIOID TOLERANCE  
  
 * oxyCODONE CR 60 mg CR tablet Commonly known as:  OxyCONTIN Take 1 tablet by mouth every six (6) hours for 30 days. For chronic pain  Indications: CHRONIC PAIN WITH OPIOID TOLERANCE, SEVERE PAIN WITH OPIOID TOLERANCE  
  
 * oxyCODONE CR 60 mg CR tablet Commonly known as:  OxyCONTIN Take 1 tablet by mouth every six (6) hours for 30 days. Indications: CHRONIC PAIN WITH OPIOID TOLERANCE, SEVERE PAIN WITH OPIOID TOLERANCE  
  
 * oxyCODONE IR 30 mg immediate release tablet Commonly known as:  OXY-IR Take 1 Tab by mouth four (4) times daily as needed for Pain. Max Daily Amount: 120 mg.  
  
 * oxyCODONE CR 60 mg CR tablet Commonly known as:  OxyCONTIN Take 1 Tab by mouth every six (6) hours for 30 days. Indications: CHRONIC PAIN WITH OPIOID TOLERANCE, SEVERE PAIN WITH OPIOID TOLERANCE  
  
 * oxyCODONE IR 15 mg immediate release tablet Commonly known as:  OXY-IR  
  
 * OxyCONTIN 20 mg ER tablet Generic drug:  oxyCODONE ER  
  
 polyethylene glycol 17 gram/dose powder Commonly known as:  Angel Parmarten Take 17 g by mouth daily. potassium chloride 20 mEq tablet Commonly known as:  K-DUR, KLOR-CON Take 1 Tab by mouth daily. predniSONE 10 mg dose pack Commonly known as:  STERAPRED DS See administration instruction per 10mg dose pack  
  
 simvastatin 40 mg tablet Commonly known as:  ZOCOR Take 1 Tab by mouth nightly. SUMAtriptan 50 mg tablet Commonly known as:  IMITREX Take 1 Tab by mouth once as needed for Migraine for up to 1 dose. TRAZODONE PO Take 30 mg by mouth nightly as needed. WELLBUTRIN  mg SR tablet Generic drug:  buPROPion SR Take 400 mg by mouth daily. * Notice: This list has 21 medication(s) that are the same as other medications prescribed for you. Read the directions carefully, and ask your doctor or other care provider to review them with you. Prescriptions Sent to Pharmacy Refills  
 predniSONE (STERAPRED DS) 10 mg dose pack 0 Sig: See administration instruction per 10mg dose pack Class: Normal  
 Pharmacy: [address] #: [phone] Follow-up Instructions Return if symptoms worsen or fail to improve. Patient Instructions Insect Stings and Bites: Care Instructions Your Care Instructions Stings and bites from bees, wasps, ants, and other insects often cause pain, swelling, redness, and itching. In some people, especially children, the redness and swelling may be worse. It may extend several inches beyond the affected area. But in most cases, stings and bites don't cause reactions all over the body. If you have had a reaction to an insect sting or bite, you are at risk for a reaction if you get stung or bitten again. Follow-up care is a key part of your treatment and safety. Be sure to make and go to all appointments, and call your doctor if you are having problems. It's also a good idea to know your test results and keep a list of the medicines you take. How can you care for yourself at home? · Do not scratch or rub the skin where the sting or bite occurred. · Put a cold pack or ice cube on the area. Put a thin cloth between the ice and your skin. For some people, a paste of baking soda mixed with a little water helps relieve pain and decrease the reaction. · Take an over-the-counter antihistamine, such as diphenhydramine (Benadryl) or loratadine (Claritin), to relieve swelling, redness, and itching. Calamine lotion or hydrocortisone cream may also help. Do not give antihistamines to your child unless you have checked with the doctor first. 
· Be safe with medicines. If your doctor prescribed medicine for your allergy, take it exactly as prescribed. Call your doctor if you think you are having a problem with your medicine. You will get more details on the specific medicines your doctor prescribes. · Your doctor may prescribe a shot of epinephrine to carry with you in case you have a severe reaction. Learn how and when to give yourself the shot, and keep it with you at all times. Make sure it has not . · Go to the emergency room anytime you have a severe reaction. Go even if you have given yourself epinephrine and are feeling better. Symptoms can come back. When should you call for help? Call 911 anytime you think you may need emergency care. For example, call if: 
· You have symptoms of a severe allergic reaction. These may include: 
¨ Sudden raised, red areas (hives) all over your body. ¨ Swelling of the throat, mouth, lips, or tongue. ¨ Trouble breathing. ¨ Passing out (losing consciousness). Or you may feel very lightheaded or suddenly feel weak, confused, or restless. Call your doctor now or seek immediate medical care if: 
· You have symptoms of an allergic reaction not right at the sting or bite, such as: ¨ A rash or small area of hives (raised, red areas on the skin). ¨ Itching. ¨ Swelling. ¨ Belly pain, nausea, or vomiting. · You have a lot of swelling around the site (such as your entire arm or leg is swollen). · You have signs of infection, such as: 
¨ Increased pain, swelling, redness, or warmth around the sting. ¨ Red streaks leading from the area. ¨ Pus draining from the sting. ¨ A fever. Watch closely for changes in your health, and be sure to contact your doctor if: 
· You do not get better as expected. Where can you learn more? Go to http://trent-eliot.info/. Enter P390 in the search box to learn more about \"Insect Stings and Bites: Care Instructions. \" Current as of: March 20, 2017 Content Version: 11.3 © 5746-1728 [employer]. Care instructions adapted under license by [employer] (which disclaims liability or warranty for this information). If you have questions about a medical condition or this instruction, always ask your healthcare professional. [address] any warranty or liability for your use of this information. Introducing [facility]! Dru Scott introduces Dovo patient portal. Now you can access parts of your medical record, email your doctor's office, and request medication refills online. 1. In your internet browser, go to [email or web address] 2. Click on the First Time User? Click Here link in the Sign In box. You will see the New Member Sign Up page. 3. Enter your Dovo Access Code exactly as it appears below. You will not need to use this code after youve completed the sign-up process. If you do not sign up before the expiration date, you must request a new code. · Dovo Access Code: [email or web address] Expires: 11/21/2017  2:43 PM 
 
4. Enter the last four digits of your Social Security Number (xxxx) and Date of Birth (mm/dd/yyyy) as indicated and click Submit. You will be taken to the next sign-up page. 5. Create a Dovo ID.  This will be your Dovo login ID and cannot be changed, so think of one that is secure and easy to remember. 6. Create a [email or web address] password. You can change your password at any time. 7. Enter your Password Reset Question and Answer. This can be used at a later time if you forget your password. 8. Enter your e-mail address. You will receive e-mail notification when new information is available in [address] 9. Click Sign Up. You can now view and download portions of your medical record. 10. Click the Download Summary menu link to download a portable copy of your medical information. If you have questions, please visit the Frequently Asked Questions section of the [email or web address] website. Remember, [email or web address] is NOT to be used for urgent needs. For medical emergencies, dial 911. Now available from your iPhone and Android! Please provide this summary of care documentation to your next provider. Your primary care clinician is listed as Osmin Peña. If you have any questions after today's visit, please call [phone].

## 2017-08-30 NOTE — PROGRESS NOTES
History of Present Illness  Sesar Ceballos is a 42 y.o. female who presents today for management of    Chief Complaint   Patient presents with    Rash       Patient reports of persistent of rash on her arms. She was in the process of moving and was opening boxes when it first appeared 2 weeks ago. She went to the ER and was treated for scabies and was given steroids. She saw her PCP last week and was given doxycyline. She did not take the second course of  steroids. This morning, patient woke up with new lesions on her arm, chin and neck. No new medications. No recent travel or camping. Her roommate also has similar-looking rash but less in number and severity. Ricketsii antibody negative. She also reports of fatigue, low-grade fever.     Problem List  Patient Active Problem List    Diagnosis Date Noted    Essential hypertension 08/23/2017    Hypercholesteremia 04/23/2013    Hypothyroidism 04/23/2013    HTN (hypertension) 04/23/2013    Constipation 04/23/2013    Adverse reaction to narcotic drug 11/30/2012    Chronic pain syndrome 10/03/2012    Unspecified vitamin D deficiency 07/06/2012    Anemia 07/06/2012    Hyperglycemia 07/06/2012    Unspecified essential hypertension 03/16/2012    Other malaise and fatigue 09/21/2011    Encounter for long-term (current) use of other medications 09/21/2011    Pain in limb 09/21/2011    Disturbance of skin sensation 09/21/2011    Spasm of muscle 09/21/2011    Other and unspecified hyperlipidemia 09/21/2011    Bipolar affective disorder ([facility] [address] 09/21/2011    Obesity 09/21/2011    Persistent disorder of initiating or maintaining sleep 09/21/2011    PCOS (polycystic ovarian syndrome) 09/21/2011    Reflex sympathetic dystrophy of the lower limb 07/27/2011       Past Medical History  Past Medical History:   Diagnosis Date    ADD (attention deficit disorder)     Chronic pain     back    DDD (degenerative disc disease)     Depression     Hypercholesterolemia     Hypertension     Psychotic disorder     Reflex sympathetic dystrophy of the lower limb 7/27/2011    RSD (reflex sympathetic dystrophy)     RSD (reflex sympathetic dystrophy)     Sacralization     L5    Sprained ankle     Thyroid disease         Surgical History  No past surgical history on file. Current Medications  Current Outpatient Prescriptions   Medication Sig    predniSONE (STERAPRED DS) 10 mg dose pack See administration instruction per 10mg dose pack    potassium chloride (K-DUR, KLOR-CON) 20 mEq tablet Take 1 Tab by mouth daily.  metFORMIN (GLUCOPHAGE) 500 mg tablet Take 1 Tab by mouth two (2) times daily (with meals).  levothyroxine (SYNTHROID) 50 mcg tablet Take 1 Tab by mouth Daily (before breakfast).  losartan (COZAAR) 50 mg tablet Take 1 Tab by mouth daily.  Omeprazole delayed release (PRILOSEC D/R) 20 mg tablet Take 1 Tab by mouth two (2) times a day.  polyethylene glycol (MIRALAX) 17 gram/dose powder Take 17 g by mouth daily.  hydroCHLOROthiazide (HYDRODIURIL) 25 mg tablet Take 1 Tab by mouth daily.  simvastatin (ZOCOR) 40 mg tablet Take 1 Tab by mouth nightly.  hydrOXYzine HCl (ATARAX) 25 mg tablet Take 1 Tab by mouth three (3) times daily as needed for Itching for up to 10 days.  doxycycline (MONODOX) 100 mg capsule Take 1 Cap by mouth two (2) times a day.  HYDROmorphone (DILAUDID) 8 mg tablet Take 8 mg by mouth every four (4) hours as needed for Pain.  linaclotide (LINZESS) 145 mcg cap capsule Take  by mouth Daily (before breakfast).  fluticasone (FLONASE) 50 mcg/actuation nasal spray 2 Sprays by Both Nostrils route daily.  mefenamic acid (PONSTEL) 250 mg capsule Take 1 Cap by mouth every six (6) hours.  oxyCODONE IR (OXY-IR) 15 mg immediate release tablet     OXYCONTIN 20 mg ER tablet     lorcaserin (BELVIQ) 10 mg tab Take 10 mg by mouth two (2) times a day.  Max Daily Amount: 20 mg.    oxyCODONE IR (OXY-IR) 30 mg immediate release tablet Take 1 Tab by mouth four (4) times daily as needed for Pain. Max Daily Amount: 120 mg.    SUMAtriptan (IMITREX) 50 mg tablet Take 1 Tab by mouth once as needed for Migraine for up to 1 dose.  amitriptyline (ELAVIL) 150 mg tablet Take 150 mg by mouth nightly.  TRAZODONE HCL (TRAZODONE PO) Take 30 mg by mouth nightly as needed.  buPROPion SR (WELLBUTRIN SR) 200 mg SR tablet Take 400 mg by mouth daily.  clonazepam (KLONOPIN) 1 mg tablet Take 1 mg by mouth four (4) times daily.  lamotrigine (LAMICTAL) 200 mg tablet Take 200 mg by mouth daily.  hydrochlorothiazide (HYDRODIURIL) 25 mg tablet Take 1 Tab by mouth daily for 30 days. Indications: HYPERTENSION    oxyCODONE CR (OXYCONTIN) 60 mg CR tablet Take 1 Tab by mouth every six (6) hours for 30 days. Indications: CHRONIC PAIN WITH OPIOID TOLERANCE, SEVERE PAIN WITH OPIOID TOLERANCE    HYDROmorphone (DILAUDID) 8 mg tablet Take 1.5 Tabs by mouth four (4) times daily as needed for Pain for up to 30 days. Indications: PAIN    HYDROmorphone (DILAUDID) 8 mg tablet Take 1.5 Tabs by mouth four (4) times daily as needed for Pain for up to 30 days. Indications: PAIN    HYDROmorphone (DILAUDID) 8 mg tablet Take 1.5 tablets by mouth four (4) times daily as needed for Pain for up to 30 days. Indications: PAIN    HYDROmorphone (DILAUDID) 8 mg tablet Take 1.5 tablets by mouth four (4) times daily as needed for Pain for up to 30 days. Indications: PAIN    oxyCODONE CR (OXYCONTIN) 60 mg CR tablet Take 1 tablet by mouth every six (6) hours for 30 days. Indications: CHRONIC PAIN WITH OPIOID TOLERANCE, SEVERE PAIN WITH OPIOID TOLERANCE    oxyCODONE CR (OXYCONTIN) 60 mg CR tablet Take 1 tablet by mouth every six (6) hours for 30 days. For chronic pain  Indications: CHRONIC PAIN WITH OPIOID TOLERANCE, SEVERE PAIN WITH OPIOID TOLERANCE    oxyCODONE CR (OXYCONTIN) 60 mg CR tablet Take 1 tablet by mouth every six (6) hours for 30 days. Indications: CHRONIC PAIN WITH OPIOID TOLERANCE, SEVERE PAIN WITH OPIOID TOLERANCE    oxyCODONE CR (OXYCONTIN) 40 mg CR tablet Take 1 Tab by mouth every six (6) hours for 30 days. For chronic pain  Fill on or after:  9/1/14  Indications: CHRONIC PAIN, SEVERE PAIN    oxyCODONE CR (OXYCONTIN) 40 mg CR tablet Take 1 Tab by mouth every six (6) hours for 30 days. For chronic pain  Fill on or after:  8/2/14  Indications: CHRONIC PAIN, SEVERE PAIN    oxyCODONE CR (OXYCONTIN) 40 mg CR tablet Take 1 Tab by mouth every six (6) hours for 30 days. For chronic pain  Fill on or after:  7/5/14  Indications: CHRONIC PAIN, SEVERE PAIN    oxyCODONE CR (OXYCONTIN) 40 mg CR tablet Take 1 Tab by mouth every six (6) hours for 30 days. For chronic pain  Fill on or after:  6/7/14  Indications: NEUROPATHIC PAIN, PAIN    oxyCODONE CR (OXYCONTIN) 40 mg CR tablet Take 1 Tab by mouth every six (6) hours for 30 days. For chronic pain  Fill on or after:  5/9/14  Indications: NEUROPATHIC PAIN, PAIN    oxyCODONE CR (OXYCONTIN) 40 mg CR tablet Take 1 Tab by mouth every six (6) hours for 30 days. For chronic pain  Fill on or after:  4/11/14  Indications: NEUROPATHIC PAIN, PAIN    HYDROmorphone (DILAUDID) 8 mg tablet Take 1.5 Tabs by mouth four (4) times daily as needed for Pain for 30 days. FILL ON OR AFTER:  10/18/13  Indications: PAIN    ergocalciferol (ERGOCALCIFEROL) 50,000 unit capsule Take 1 Cap by mouth two (2) times a week for 30 days. Indications: VITAMIN D DEFICIENCY     No current facility-administered medications for this visit.         Allergies/Drug Reactions  Allergies   Allergen Reactions    Methadone Other (comments)     Hullucinations,spacy feeling      Morphine Other (comments)     Mood swings,suicidal thoughts    Pcn [Penicillins] Rash    Tramadol Rash     And suicidal thoughts        Family History  Family History   Problem Relation Age of Onset    Diabetes Mother     Heart Disease Father     Stroke Father    Zayra Mijares Breast Cancer Paternal Grandmother         Social History  Social History     Social History    Marital status:      Spouse name: N/A    Number of children: N/A    Years of education: N/A     Occupational History    Not on file. Social History Main Topics    Smoking status: Current Every Day Smoker     Packs/day: 0.30     Years: 23.00     Types: Cigarettes    Smokeless tobacco: Never Used      Comment: \"not ready to quit yet\"    Alcohol use No    Drug use: No    Sexual activity: Not Currently     Other Topics Concern    Not on file     Social History Narrative       Review of Systems  Negative except as mentioned in HPI      Physical Exam  Vital signs:   Vitals:    08/30/17 1105   BP: 160/90   Pulse: (!) 104   Resp: 20   Temp: 98.8 °F (37.1 °C)   TempSrc: Oral   SpO2: 96%   Weight: 219 lb 9.6 oz (99.6 kg)   Height: 5' 7\" (1.702 m)       General: alert, oriented, not in distress  Chest/Lungs: clear breath sounds, no wheezing or crackles  Heart: normal rate, regular rhythm, no murmur  Abdomen: soft, non-distended, non-tender, normal bowel sounds, no organomegaly, no masses  Extremities: no focal deformities, no edema      Assessment/Plan:      ICD-10-CM ICD-9-CM    1. Rash R21 782. 1 predniSONE (STERAPRED DS) 10 mg dose pack   2. Insect bite, subsequent encounter W57. XXXD E906.4      Restart prednisone  Advised to call a pest control company. Anti-histamine as needed for itching      Follow-up Disposition:  Return if symptoms worsen or fail to improve. I have discussed the diagnosis with the patient and the intended plan as seen in the above orders. The patient has received an after-visit summary and questions were answered concerning future plans. I have discussed medication side effects and warnings with the patient as well. I have reviewed the plan of care with the patient, accepted their input and they are in agreement with the treatment goals.        Syed Landrum MD  August 30, 2017

## 2017-08-30 NOTE — PROGRESS NOTES
1. Have you been to the ER, urgent care clinic since your last visit? Hospitalized since your last visit? Yes When: 8/2017 Where: [facility] [place] Reason for visit: rash    2. Have you seen or consulted any other health care providers outside of the [address] [place] [address] since your last visit? Include any pap smears or colon screening.  No

## 2017-08-30 NOTE — PATIENT INSTRUCTIONS
Insect Stings and Bites: Care Instructions  Your Care Instructions  Stings and bites from bees, wasps, ants, and other insects often cause pain, swelling, redness, and itching. In some people, especially children, the redness and swelling may be worse. It may extend several inches beyond the affected area. But in most cases, stings and bites don't cause reactions all over the body. If you have had a reaction to an insect sting or bite, you are at risk for a reaction if you get stung or bitten again. Follow-up care is a key part of your treatment and safety. Be sure to make and go to all appointments, and call your doctor if you are having problems. It's also a good idea to know your test results and keep a list of the medicines you take. How can you care for yourself at home? · Do not scratch or rub the skin where the sting or bite occurred. · Put a cold pack or ice cube on the area. Put a thin cloth between the ice and your skin. For some people, a paste of baking soda mixed with a little water helps relieve pain and decrease the reaction. · Take an over-the-counter antihistamine, such as diphenhydramine (Benadryl) or loratadine (Claritin), to relieve swelling, redness, and itching. Calamine lotion or hydrocortisone cream may also help. Do not give antihistamines to your child unless you have checked with the doctor first.  · Be safe with medicines. If your doctor prescribed medicine for your allergy, take it exactly as prescribed. Call your doctor if you think you are having a problem with your medicine. You will get more details on the specific medicines your doctor prescribes. · Your doctor may prescribe a shot of epinephrine to carry with you in case you have a severe reaction. Learn how and when to give yourself the shot, and keep it with you at all times. Make sure it has not . · Go to the emergency room anytime you have a severe reaction.  Go even if you have given yourself epinephrine and are feeling better. Symptoms can come back. When should you call for help? Call 911 anytime you think you may need emergency care. For example, call if:  · You have symptoms of a severe allergic reaction. These may include:  ¨ Sudden raised, red areas (hives) all over your body. ¨ Swelling of the throat, mouth, lips, or tongue. ¨ Trouble breathing. ¨ Passing out (losing consciousness). Or you may feel very lightheaded or suddenly feel weak, confused, or restless. Call your doctor now or seek immediate medical care if:  · You have symptoms of an allergic reaction not right at the sting or bite, such as:  ¨ A rash or small area of hives (raised, red areas on the skin). ¨ Itching. ¨ Swelling. ¨ Belly pain, nausea, or vomiting. · You have a lot of swelling around the site (such as your entire arm or leg is swollen). · You have signs of infection, such as:  ¨ Increased pain, swelling, redness, or warmth around the sting. ¨ Red streaks leading from the area. ¨ Pus draining from the sting. ¨ A fever. Watch closely for changes in your health, and be sure to contact your doctor if:  · You do not get better as expected. Where can you learn more? Go to http://trent-eliot.info/. Enter P390 in the search box to learn more about \"Insect Stings and Bites: Care Instructions. \"  Current as of: March 20, 2017  Content Version: 11.3  © 9152-3781 [email or web address]. Care instructions adapted under license by [employer] (which disclaims liability or warranty for this information). If you have questions about a medical condition or this instruction, always ask your healthcare professional. [place] [address] any warranty or liability for your use of this information.

## 2017-09-12 ENCOUNTER — OFFICE VISIT (OUTPATIENT)
Dept: FAMILY MEDICINE CLINIC | Age: 42
End: 2017-09-12

## 2017-09-12 VITALS
RESPIRATION RATE: 15 BRPM | TEMPERATURE: 98.8 F | BODY MASS INDEX: 34.06 KG/M2 | HEIGHT: 67 IN | OXYGEN SATURATION: 98 % | WEIGHT: 217 LBS | DIASTOLIC BLOOD PRESSURE: 103 MMHG | SYSTOLIC BLOOD PRESSURE: 157 MMHG | HEART RATE: 98 BPM
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
## 2017-09-12 DIAGNOSIS — I10 ESSENTIAL HYPERTENSION WITH GOAL BLOOD PRESSURE LESS THAN 140/90: Primary | ICD-10-CM

## 2017-09-12 DIAGNOSIS — L30.9 DERMATITIS: ICD-10-CM

## 2017-09-12 RX ORDER — CLOBETASOL PROPIONATE 0.5 MG/G
OINTMENT TOPICAL 2 TIMES DAILY
Qty: 15 G | Refills: 3 | Status: SHIPPED | OUTPATIENT
Start: 2017-09-12 | End: 2019-03-08

## 2017-09-12 RX ORDER — LOSARTAN POTASSIUM 50 MG/1
50 TABLET ORAL 2 TIMES DAILY
Qty: 60 TAB | Refills: 3 | Status: SHIPPED | OUTPATIENT
Start: 2017-09-12 | End: 2017-12-16 | Stop reason: SDUPTHER

## 2017-09-12 NOTE — MR AVS SNAPSHOT
Visit Information Date & Time Provider Department Dept. Phone Encounter #  
 9/12/2017  2:30 PM Darwin Martinez, [address] [facility] [id number] Follow-up Instructions Return in about 3 months (around 12/12/2017). Your Appointments 9/25/2017 11:00 AM  
Follow Up with Darwin Martinez MD  
[facility] Appt Note: Return in 1 month for F/U  
 [facility] [address]  
  
   
 [address] [facility] [address] [place] [address] Upcoming Health Maintenance Date Due  
 FOOT EXAM Q1 9/2/1984 MICROALBUMIN Q1 9/2/1984 EYE EXAM RETINAL OR DILATED Q1 9/2/1984 Pneumococcal 19-64 Medium Risk (1 of 1 - PPSV23) 9/2/1993 INFLUENZA AGE 9 TO ADULT 8/1/2017 HEMOGLOBIN A1C Q6M 2/23/2018 PAP AKA CERVICAL CYTOLOGY 8/12/2018 LIPID PANEL Q1 8/23/2018 DTaP/Tdap/Td series (2 - Td) 7/1/2024 Allergies as of 9/12/2017  Review Complete On: 9/12/2017 By: Darwin Martinez MD  
  
 Severity Noted Reaction Type Reactions Methadone  11/30/2012    Other (comments) Hullucinations,spacy feeling Morphine  11/21/2011    Other (comments) Mood swings,suicidal thoughts Pcn [Penicillins]    Rash Tramadol  11/21/2011    Rash And suicidal thoughts Current Immunizations  Reviewed on 7/1/2014 Name Date Tdap 7/1/2014  2:22 PM  
  
 Not reviewed this visit You Were Diagnosed With   
  
 Codes Comments Essential hypertension with goal blood pressure less than 140/90    -  Primary ICD-10-CM: I10 
ICD-9-CM: 401.9 Dermatitis     ICD-10-CM: L30.9 ICD-9-CM: 692.9 Bed bug bite, initial encounter     ICD-10-CM: W57. Marzella Mins ICD-9-CM: 919.4 Vitals BP Pulse Temp Resp Height(growth percentile) Weight(growth percentile) (!) 157/103 98 98.8 °F (37.1 °C) (Oral) 15 5' 7\" (1.702 m) 217 lb (98.4 kg) LMP SpO2 BMI OB Status Smoking Status 08/13/2017 98% 33.99 kg/m2 Having regular periods Current Every Day Smoker Vitals History BMI and BSA Data Body Mass Index Body Surface Area  
 33.99 kg/m 2 2.16 m 2 Preferred Pharmacy Pharmacy Name Phone [facility], [address] [place] [address] Road [address] [facility] [phone] Your Updated Medication List  
  
   
This list is accurate as of: 9/12/17  3:40 PM.  Always use your most recent med list.  
  
  
  
  
 amitriptyline 150 mg tablet Commonly known as:  ELAVIL Take 150 mg by mouth nightly. clobetasol 0.05 % ointment Commonly known as:  Coye Shear Apply  to affected area two (2) times a day. doxycycline 100 mg capsule Commonly known as:  Scarlet Lat Take 1 Cap by mouth two (2) times a day. ergocalciferol 50,000 unit capsule Commonly known as:  ERGOCALCIFEROL Take 1 Cap by mouth two (2) times a week for 30 days. Indications: VITAMIN D DEFICIENCY  
  
 fluticasone 50 mcg/actuation nasal spray Commonly known as:  Marine Knack 2 Sprays by Both Nostrils route daily. * hydroCHLOROthiazide 25 mg tablet Commonly known as:  HYDRODIURIL Take 1 Tab by mouth daily for 30 days. Indications: HYPERTENSION  
  
 * hydroCHLOROthiazide 25 mg tablet Commonly known as:  HYDRODIURIL Take 1 Tab by mouth daily. * HYDROmorphone 8 mg tablet Commonly known as:  DILAUDID Take 8 mg by mouth every four (4) hours as needed for Pain. * HYDROmorphone 8 mg tablet Commonly known as:  DILAUDID Take 1.5 Tabs by mouth four (4) times daily as needed for Pain for 30 days. FILL ON OR AFTER: 10/18/13  Indications: PAIN  
  
 * HYDROmorphone 8 mg tablet Commonly known as:  DILAUDID Take 1.5 tablets by mouth four (4) times daily as needed for Pain for up to 30 days. Indications: PAIN  
  
 * HYDROmorphone 8 mg tablet Commonly known as:  DILAUDID Take 1.5 tablets by mouth four (4) times daily as needed for Pain for up to 30 days. Indications: PAIN  
  
 * HYDROmorphone 8 mg tablet Commonly known as:  DILAUDID Take 1.5 Tabs by mouth four (4) times daily as needed for Pain for up to 30 days. Indications: PAIN  
  
 * HYDROmorphone 8 mg tablet Commonly known as:  DILAUDID Take 1.5 Tabs by mouth four (4) times daily as needed for Pain for up to 30 days. Indications: PAIN KlonoPIN 1 mg tablet Generic drug:  clonazePAM  
Take 1 mg by mouth four (4) times daily. LaMICtal 200 mg tablet Generic drug:  lamoTRIgine Take 200 mg by mouth daily. levothyroxine 50 mcg tablet Commonly known as:  SYNTHROID Take 1 Tab by mouth Daily (before breakfast). LINZESS 145 mcg Cap capsule Generic drug:  linaclotide Take  by mouth Daily (before breakfast). lorcaserin 10 mg Tab Commonly known as:  Christell Lutz Take 10 mg by mouth two (2) times a day. Max Daily Amount: 20 mg.  
  
 losartan 50 mg tablet Commonly known as:  COZAAR Take 1 Tab by mouth two (2) times a day. mefenamic acid 250 mg capsule Commonly known as:  PONSTEL Take 1 Cap by mouth every six (6) hours. metFORMIN 500 mg tablet Commonly known as:  GLUCOPHAGE Take 1 Tab by mouth two (2) times daily (with meals). Omeprazole delayed release 20 mg tablet Commonly known as:  PRILOSEC D/R Take 1 Tab by mouth two (2) times a day. * oxyCODONE CR 40 mg CR tablet Commonly known as:  OxyCONTIN Take 1 Tab by mouth every six (6) hours for 30 days. For chronic pain Fill on or after: 4/11/14  Indications: NEUROPATHIC PAIN, PAIN  
  
 * oxyCODONE CR 40 mg CR tablet Commonly known as:  OxyCONTIN Take 1 Tab by mouth every six (6) hours for 30 days. For chronic pain Fill on or after: 5/9/14  Indications: NEUROPATHIC PAIN, PAIN  
  
 * oxyCODONE CR 40 mg CR tablet Commonly known as:  OxyCONTIN Take 1 Tab by mouth every six (6) hours for 30 days.  For chronic pain Fill on or after: 6/7/14  Indications: NEUROPATHIC PAIN, PAIN  
  
 * oxyCODONE CR 40 mg CR tablet Commonly known as:  OxyCONTIN Take 1 Tab by mouth every six (6) hours for 30 days. For chronic pain Fill on or after: 7/5/14  Indications: CHRONIC PAIN, SEVERE PAIN  
  
 * oxyCODONE CR 40 mg CR tablet Commonly known as:  OxyCONTIN Take 1 Tab by mouth every six (6) hours for 30 days. For chronic pain Fill on or after: 8/2/14  Indications: CHRONIC PAIN, SEVERE PAIN  
  
 * oxyCODONE CR 40 mg CR tablet Commonly known as:  OxyCONTIN Take 1 Tab by mouth every six (6) hours for 30 days. For chronic pain Fill on or after: 9/1/14  Indications: CHRONIC PAIN, SEVERE PAIN  
  
 * oxyCODONE CR 60 mg CR tablet Commonly known as:  OxyCONTIN Take 1 tablet by mouth every six (6) hours for 30 days. Indications: CHRONIC PAIN WITH OPIOID TOLERANCE, SEVERE PAIN WITH OPIOID TOLERANCE  
  
 * oxyCODONE CR 60 mg CR tablet Commonly known as:  OxyCONTIN Take 1 tablet by mouth every six (6) hours for 30 days. For chronic pain  Indications: CHRONIC PAIN WITH OPIOID TOLERANCE, SEVERE PAIN WITH OPIOID TOLERANCE  
  
 * oxyCODONE CR 60 mg CR tablet Commonly known as:  OxyCONTIN Take 1 tablet by mouth every six (6) hours for 30 days. Indications: CHRONIC PAIN WITH OPIOID TOLERANCE, SEVERE PAIN WITH OPIOID TOLERANCE  
  
 * oxyCODONE IR 30 mg immediate release tablet Commonly known as:  Abram Yuli Take 1 Tab by mouth four (4) times daily as needed for Pain. Max Daily Amount: 120 mg.  
  
 * oxyCODONE CR 60 mg CR tablet Commonly known as:  OxyCONTIN Take 1 Tab by mouth every six (6) hours for 30 days. Indications: CHRONIC PAIN WITH OPIOID TOLERANCE, SEVERE PAIN WITH OPIOID TOLERANCE  
  
 * oxyCODONE IR 15 mg immediate release tablet Commonly known as:  OXY-IR  
  
 * OxyCONTIN 20 mg ER tablet Generic drug:  oxyCODONE ER  
  
 polyethylene glycol 17 gram/dose powder Commonly known as:  Dennis Allen Take 17 g by mouth daily. potassium chloride 20 mEq tablet Commonly known as:  K-DUR, KLOR-CON Take 1 Tab by mouth daily. predniSONE 10 mg dose pack Commonly known as:  STERAPRED DS See administration instruction per 10mg dose pack  
  
 simvastatin 40 mg tablet Commonly known as:  ZOCOR Take 1 Tab by mouth nightly. SUMAtriptan 50 mg tablet Commonly known as:  IMITREX Take 1 Tab by mouth once as needed for Migraine for up to 1 dose. TRAZODONE PO Take 30 mg by mouth nightly as needed. WELLBUTRIN  mg SR tablet Generic drug:  buPROPion SR Take 400 mg by mouth daily. * Notice: This list has 21 medication(s) that are the same as other medications prescribed for you. Read the directions carefully, and ask your doctor or other care provider to review them with you. Prescriptions Sent to Pharmacy Refills  
 losartan (COZAAR) 50 mg tablet 3 Sig: Take 1 Tab by mouth two (2) times a day. Class: Normal  
 Pharmacy: [address] [place] [address] Ph #: [phone] Route: Oral  
 clobetasol (TEMOVATE) 0.05 % ointment 3 Sig: Apply  to affected area two (2) times a day. Class: Normal  
 Pharmacy: [address] [place] [address] Ph #: [phone] Route: Topical  
  
Follow-up Instructions Return in about 3 months (around 12/12/2017). To-Do List   
 09/19/2017 2:15 PM  
  Appointment with [facility] at [facility] ([phone]) OUTSIDE FILMS  - Any outside films related to the study being scheduled should be brought with you on the day of the exam.  If this cannot be done there may be a delay in the reading of the study.   MEDICATIONS  - Patient must bring a complete list of all medications currently taking to include prescriptions, over-the-counter meds, herbals, vitamins & any dietary supplements  GENERAL INSTRUCTIONS  - On the day of your exam do not use any bath powder, deodorant or lotions on the armpit area. -Tenderness of breasts may cause an increase of discomfort during procedure. If you are experiencing breast tenderness on the day of your appointment and would like to reschedule, please call [phone]. Introducing [facility] SERVICES! Isela Resendez introduces Pay[employer] patient portal. Now you can access parts of your medical record, email your doctor's office, and request medication refills online. 1. In your internet browser, go to [email or web address] 2. Click on the First Time User? Click Here link in the Sign In box. You will see the New Member Sign Up page. 3. Enter your Pay[employer] Access Code exactly as it appears below. You will not need to use this code after youve completed the sign-up process. If you do not sign up before the expiration date, you must request a new code. · [employer] Access Code: [email or web address] Expires: 11/21/2017  2:43 PM 
 
4. Enter the last four digits of your Social Security Number (xxxx) and Date of Birth (mm/dd/yyyy) as indicated and click Submit. You will be taken to the next sign-up page. 5. Create a Pay[employer]irma ID. This will be your [employer] login ID and cannot be changed, so think of one that is secure and easy to remember. 6. Create a [employer] password. You can change your password at any time. 7. Enter your Password Reset Question and Answer. This can be used at a later time if you forget your password. 8. Enter your e-mail address. You will receive e-mail notification when new information is available in [address] 9. Click Sign Up. You can now view and download portions of your medical record. 10. Click the Download Summary menu link to download a portable copy of your medical information.  
 
If you have questions, please visit the Frequently Asked Questions section of the [employer]. Remember, [employer] is NOT to be used for urgent needs. For medical emergencies, dial 911. Now available from your iPhone and Android! Please provide this summary of care documentation to your next provider. Your primary care clinician is listed as Bradley Langley. If you have any questions after today's visit, please call [phone].

## 2017-09-12 NOTE — PROGRESS NOTES
1. Have you been to the ER, urgent care clinic since your last visit? Hospitalized since your last visit? No    2. Have you seen or consulted any other health care providers outside of the [address] [place] [address] since your last visit? Include any pap smears or colon screening.  No

## 2017-09-12 NOTE — PROGRESS NOTES
Jerrod Gaines is a 36 y.o.  female and presents with     Chief Complaint   Patient presents with    Hypertension    Rash     Pt says the rash on her hands has improved some. Pt says the tenant who lived in the home that moved out had bed bugs. She found bed bugs in her bed. .  She could not go on  a cruise due to ongoing skin problem. Past Medical History:   Diagnosis Date    ADD (attention deficit disorder)     Chronic pain     back    DDD (degenerative disc disease)     Depression     Hypercholesterolemia     Hypertension     Psychotic disorder     Reflex sympathetic dystrophy of the lower limb 7/27/2011    RSD (reflex sympathetic dystrophy)     RSD (reflex sympathetic dystrophy)     Sacralization     L5    Sprained ankle     Thyroid disease      No past surgical history on file. Current Outpatient Prescriptions   Medication Sig    losartan (COZAAR) 50 mg tablet Take 1 Tab by mouth two (2) times a day.  clobetasol (TEMOVATE) 0.05 % ointment Apply  to affected area two (2) times a day.  predniSONE (STERAPRED DS) 10 mg dose pack See administration instruction per 10mg dose pack    potassium chloride (K-DUR, KLOR-CON) 20 mEq tablet Take 1 Tab by mouth daily.  metFORMIN (GLUCOPHAGE) 500 mg tablet Take 1 Tab by mouth two (2) times daily (with meals).  levothyroxine (SYNTHROID) 50 mcg tablet Take 1 Tab by mouth Daily (before breakfast).  Omeprazole delayed release (PRILOSEC D/R) 20 mg tablet Take 1 Tab by mouth two (2) times a day.  polyethylene glycol (MIRALAX) 17 gram/dose powder Take 17 g by mouth daily.  hydroCHLOROthiazide (HYDRODIURIL) 25 mg tablet Take 1 Tab by mouth daily.  simvastatin (ZOCOR) 40 mg tablet Take 1 Tab by mouth nightly.  HYDROmorphone (DILAUDID) 8 mg tablet Take 8 mg by mouth every four (4) hours as needed for Pain.  linaclotide (LINZESS) 145 mcg cap capsule Take  by mouth Daily (before breakfast).     fluticasone (FLONASE) 50 mcg/actuation nasal spray 2 Sprays by Both Nostrils route daily.  mefenamic acid (PONSTEL) 250 mg capsule Take 1 Cap by mouth every six (6) hours.  oxyCODONE IR (OXY-IR) 15 mg immediate release tablet     OXYCONTIN 20 mg ER tablet     lorcaserin (BELVIQ) 10 mg tab Take 10 mg by mouth two (2) times a day. Max Daily Amount: 20 mg.    oxyCODONE IR (OXY-IR) 30 mg immediate release tablet Take 1 Tab by mouth four (4) times daily as needed for Pain. Max Daily Amount: 120 mg.    SUMAtriptan (IMITREX) 50 mg tablet Take 1 Tab by mouth once as needed for Migraine for up to 1 dose.  amitriptyline (ELAVIL) 150 mg tablet Take 150 mg by mouth nightly.  TRAZODONE HCL (TRAZODONE PO) Take 30 mg by mouth nightly as needed.  buPROPion SR (WELLBUTRIN SR) 200 mg SR tablet Take 400 mg by mouth daily.  clonazepam (KLONOPIN) 1 mg tablet Take 1 mg by mouth four (4) times daily.  lamotrigine (LAMICTAL) 200 mg tablet Take 200 mg by mouth daily.  doxycycline (MONODOX) 100 mg capsule Take 1 Cap by mouth two (2) times a day.  hydrochlorothiazide (HYDRODIURIL) 25 mg tablet Take 1 Tab by mouth daily for 30 days. Indications: HYPERTENSION    oxyCODONE CR (OXYCONTIN) 60 mg CR tablet Take 1 Tab by mouth every six (6) hours for 30 days. Indications: CHRONIC PAIN WITH OPIOID TOLERANCE, SEVERE PAIN WITH OPIOID TOLERANCE    HYDROmorphone (DILAUDID) 8 mg tablet Take 1.5 Tabs by mouth four (4) times daily as needed for Pain for up to 30 days. Indications: PAIN    HYDROmorphone (DILAUDID) 8 mg tablet Take 1.5 Tabs by mouth four (4) times daily as needed for Pain for up to 30 days. Indications: PAIN    HYDROmorphone (DILAUDID) 8 mg tablet Take 1.5 tablets by mouth four (4) times daily as needed for Pain for up to 30 days. Indications: PAIN    HYDROmorphone (DILAUDID) 8 mg tablet Take 1.5 tablets by mouth four (4) times daily as needed for Pain for up to 30 days.  Indications: PAIN    oxyCODONE CR (OXYCONTIN) 60 mg CR tablet Take 1 tablet by mouth every six (6) hours for 30 days. Indications: CHRONIC PAIN WITH OPIOID TOLERANCE, SEVERE PAIN WITH OPIOID TOLERANCE    oxyCODONE CR (OXYCONTIN) 60 mg CR tablet Take 1 tablet by mouth every six (6) hours for 30 days. For chronic pain  Indications: CHRONIC PAIN WITH OPIOID TOLERANCE, SEVERE PAIN WITH OPIOID TOLERANCE    oxyCODONE CR (OXYCONTIN) 60 mg CR tablet Take 1 tablet by mouth every six (6) hours for 30 days. Indications: CHRONIC PAIN WITH OPIOID TOLERANCE, SEVERE PAIN WITH OPIOID TOLERANCE    oxyCODONE CR (OXYCONTIN) 40 mg CR tablet Take 1 Tab by mouth every six (6) hours for 30 days. For chronic pain  Fill on or after:  9/1/14  Indications: CHRONIC PAIN, SEVERE PAIN    oxyCODONE CR (OXYCONTIN) 40 mg CR tablet Take 1 Tab by mouth every six (6) hours for 30 days. For chronic pain  Fill on or after:  8/2/14  Indications: CHRONIC PAIN, SEVERE PAIN    oxyCODONE CR (OXYCONTIN) 40 mg CR tablet Take 1 Tab by mouth every six (6) hours for 30 days. For chronic pain  Fill on or after:  7/5/14  Indications: CHRONIC PAIN, SEVERE PAIN    oxyCODONE CR (OXYCONTIN) 40 mg CR tablet Take 1 Tab by mouth every six (6) hours for 30 days. For chronic pain  Fill on or after:  6/7/14  Indications: NEUROPATHIC PAIN, PAIN    oxyCODONE CR (OXYCONTIN) 40 mg CR tablet Take 1 Tab by mouth every six (6) hours for 30 days. For chronic pain  Fill on or after:  5/9/14  Indications: NEUROPATHIC PAIN, PAIN    oxyCODONE CR (OXYCONTIN) 40 mg CR tablet Take 1 Tab by mouth every six (6) hours for 30 days. For chronic pain  Fill on or after:  4/11/14  Indications: NEUROPATHIC PAIN, PAIN    HYDROmorphone (DILAUDID) 8 mg tablet Take 1.5 Tabs by mouth four (4) times daily as needed for Pain for 30 days. FILL ON OR AFTER:  10/18/13  Indications: PAIN    ergocalciferol (ERGOCALCIFEROL) 50,000 unit capsule Take 1 Cap by mouth two (2) times a week for 30 days.  Indications: VITAMIN D DEFICIENCY     No current facility-administered medications for this visit. Health Maintenance   Topic Date Due    FOOT EXAM Q1  09/02/1984    MICROALBUMIN Q1  09/02/1984    EYE EXAM RETINAL OR DILATED Q1  09/02/1984    Pneumococcal 19-64 Medium Risk (1 of 1 - PPSV23) 09/02/1993    INFLUENZA AGE 9 TO ADULT  08/01/2017    HEMOGLOBIN A1C Q6M  02/23/2018    PAP AKA CERVICAL CYTOLOGY  08/12/2018    LIPID PANEL Q1  08/23/2018    DTaP/Tdap/Td series (2 - Td) 07/01/2024     Immunization History   Administered Date(s) Administered    Tdap 07/01/2014     Patient's last menstrual period was 08/13/2017. Allergies and Intolerances: Allergies   Allergen Reactions    Methadone Other (comments)     Hullucinations,spacy feeling      Morphine Other (comments)     Mood swings,suicidal thoughts    Pcn [Penicillins] Rash    Tramadol Rash     And suicidal thoughts       Family History:   Family History   Problem Relation Age of Onset    Diabetes Mother     Heart Disease Father     Stroke Father     Breast Cancer Paternal Grandmother        Social History:   She  reports that she has been smoking Cigarettes. She has a 6.90 pack-year smoking history. She has never used smokeless tobacco.  She  reports that she does not drink alcohol.             Review of Systems:   General: negative for - chills, fatigue, fever, weight change  Psych: negative for - anxiety, depression, irritability or mood swings  ENT: negative for - headaches, hearing change, nasal congestion, oral lesions, sneezing or sore throat  Heme/ Lymph: negative for - bleeding problems, bruising, pallor or swollen lymph nodes  Endo: negative for - hot flashes, polydipsia/polyuria or temperature intolerance  Resp: negative for - cough, shortness of breath or wheezing  CV: negative for - chest pain, edema or palpitations  GI: negative for - abdominal pain, change in bowel habits, constipation, diarrhea or nausea/vomiting  : negative for - dysuria, hematuria, incontinence, pelvic pain or vulvar/vaginal symptoms  MSK: negative for - joint pain, joint swelling or muscle pain  Neuro: negative for - confusion, headaches, seizures or weakness  Derm: negative for - dry skin, hair changes, rash or skin lesion changes, pos for rash            Physical:   Vitals:   Vitals:    09/12/17 1449 09/12/17 1452   BP: (!) 154/94 (!) 157/103   Pulse: 98    Resp: 15    Temp: 98.8 °F (37.1 °C)    TempSrc: Oral    SpO2: 98%    Weight: 217 lb (98.4 kg)    Height: 5' 7\" (1.702 m)            Exam:   HEENT- atraumatic,normocephalic, awake, oriented, well nourished  Neck - supple,no enlarged lymph nodes, no JVD, no thyromegaly  Chest- CTA, no rhonchi, no crackles  Heart- rrr, no murmurs / gallop/rub  Abdomen- soft,BS+,NT, no hepatosplenomegaly  Ext - no c/c/edema   Neuro- no focal deficits. Power 5/5 all extremities  Skin - warm,dry, no obvious rashes, small papular lesions on arms          Review of Data:   LABS:   Lab Results   Component Value Date/Time    WBC 12.7 08/23/2017 03:48 PM    HGB 14.1 08/23/2017 03:48 PM    HCT 43.3 08/23/2017 03:48 PM    PLATELET 873 67/89/2606 03:48 PM     Lab Results   Component Value Date/Time    Sodium 138 08/23/2017 03:48 PM    Potassium 3.4 08/23/2017 03:48 PM    Chloride 99 08/23/2017 03:48 PM    CO2 29 08/23/2017 03:48 PM    Glucose 107 08/23/2017 03:48 PM    BUN 18 08/23/2017 03:48 PM    Creatinine 1.00 08/23/2017 03:48 PM     Lab Results   Component Value Date/Time    Cholesterol, total 175 08/23/2017 03:48 PM    HDL Cholesterol 59 08/23/2017 03:48 PM    LDL, calculated 84.8 08/23/2017 03:48 PM    Triglyceride 156 08/23/2017 03:48 PM     No results found for: GPT        Impression / Plan:        ICD-10-CM ICD-9-CM    1. Essential hypertension with goal blood pressure less than 140/90 I10 401.9 losartan (COZAAR) 50 mg tablet   2. Dermatitis L30.9 692.9 clobetasol (TEMOVATE) 0.05 % ointment   3. Bed bug bite, initial encounter W57. XXXA 919.4          Explained to patient risk benefits of the medications. Advised patient to stop meds if having any side effects. Pt verbalized understanding of the instructions. I have discussed the diagnosis with the patient and the intended plan as seen in the above orders. The patient has received an after-visit summary and questions were answered concerning future plans. I have discussed medication side effects and warnings with the patient as well. I have reviewed the plan of care with the patient, accepted their input and they are in agreement with the treatment goals. Reviewed plan of care. Patient has provided input and agrees with goals.     Follow-up Disposition: Not on Aminata Reyes MD

## 2017-12-19 DIAGNOSIS — J30.89 OTHER ALLERGIC RHINITIS: ICD-10-CM

## 2017-12-22 RX ORDER — FLUTICASONE PROPIONATE 50 MCG
SPRAY, SUSPENSION (ML) NASAL
Qty: 48 G | Refills: 4 | Status: SHIPPED | OUTPATIENT
Start: 2017-12-22 | End: 2018-02-06 | Stop reason: SDUPTHER

## 2018-02-06 ENCOUNTER — OFFICE VISIT (OUTPATIENT)
Dept: FAMILY MEDICINE CLINIC | Age: 43
End: 2018-02-06

## 2018-02-06 VITALS
HEART RATE: 99 BPM | BODY MASS INDEX: 33.34 KG/M2 | DIASTOLIC BLOOD PRESSURE: 90 MMHG | RESPIRATION RATE: 18 BRPM | OXYGEN SATURATION: 98 % | HEIGHT: 68 IN | TEMPERATURE: 98.9 F | WEIGHT: 220 LBS | SYSTOLIC BLOOD PRESSURE: 146 MMHG

## 2018-02-06 DIAGNOSIS — E03.4 HYPOTHYROIDISM DUE TO ACQUIRED ATROPHY OF THYROID: ICD-10-CM

## 2018-02-06 DIAGNOSIS — Z12.39 BREAST CANCER SCREENING: ICD-10-CM

## 2018-02-06 DIAGNOSIS — E66.01 MORBID OBESITY DUE TO EXCESS CALORIES (HCC): ICD-10-CM

## 2018-02-06 DIAGNOSIS — I10 ESSENTIAL HYPERTENSION WITH GOAL BLOOD PRESSURE LESS THAN 140/90: Primary | ICD-10-CM

## 2018-02-06 DIAGNOSIS — J30.89 OTHER ALLERGIC RHINITIS: ICD-10-CM

## 2018-02-06 DIAGNOSIS — E78.00 HYPERCHOLESTEREMIA: ICD-10-CM

## 2018-02-06 DIAGNOSIS — K59.00 CONSTIPATION, UNSPECIFIED CONSTIPATION TYPE: ICD-10-CM

## 2018-02-06 DIAGNOSIS — L73.2 HIDRADENITIS SUPPURATIVA: ICD-10-CM

## 2018-02-06 DIAGNOSIS — E87.6 HYPOKALEMIA: ICD-10-CM

## 2018-02-06 DIAGNOSIS — K21.9 GASTROESOPHAGEAL REFLUX DISEASE WITHOUT ESOPHAGITIS: ICD-10-CM

## 2018-02-06 RX ORDER — PHENOL/SODIUM PHENOLATE
20 AEROSOL, SPRAY (ML) MUCOUS MEMBRANE 2 TIMES DAILY
Qty: 60 TAB | Refills: 3 | Status: SHIPPED | OUTPATIENT
Start: 2018-02-06 | End: 2018-02-06 | Stop reason: SDUPTHER

## 2018-02-06 RX ORDER — CLINDAMYCIN HYDROCHLORIDE 300 MG/1
300 CAPSULE ORAL 2 TIMES DAILY
Qty: 14 CAP | Refills: 0 | Status: SHIPPED | OUTPATIENT
Start: 2018-02-06 | End: 2018-02-13

## 2018-02-06 RX ORDER — LEVOTHYROXINE SODIUM 50 UG/1
50 TABLET ORAL
Qty: 30 TAB | Refills: 3 | Status: SHIPPED | OUTPATIENT
Start: 2018-02-06 | End: 2018-08-19 | Stop reason: SDUPTHER

## 2018-02-06 RX ORDER — LOSARTAN POTASSIUM 50 MG/1
50 TABLET ORAL 2 TIMES DAILY
Qty: 60 TAB | Refills: 0 | Status: SHIPPED | OUTPATIENT
Start: 2018-02-06 | End: 2018-04-12 | Stop reason: SDUPTHER

## 2018-02-06 RX ORDER — HYDROCHLOROTHIAZIDE 25 MG/1
25 TABLET ORAL DAILY
Qty: 30 TAB | Refills: 3 | Status: SHIPPED | OUTPATIENT
Start: 2018-02-06 | End: 2018-02-06 | Stop reason: SDUPTHER

## 2018-02-06 RX ORDER — CLINDAMYCIN HYDROCHLORIDE 300 MG/1
300 CAPSULE ORAL 2 TIMES DAILY
Qty: 14 CAP | Refills: 0 | Status: SHIPPED | OUTPATIENT
Start: 2018-02-06 | End: 2018-02-06 | Stop reason: SDUPTHER

## 2018-02-06 RX ORDER — FLUTICASONE PROPIONATE 50 MCG
2 SPRAY, SUSPENSION (ML) NASAL DAILY
Qty: 48 G | Refills: 4 | Status: SHIPPED | OUTPATIENT
Start: 2018-02-06 | End: 2019-03-08

## 2018-02-06 RX ORDER — SIMVASTATIN 40 MG/1
40 TABLET, FILM COATED ORAL
Qty: 30 TAB | Refills: 3 | Status: SHIPPED | OUTPATIENT
Start: 2018-02-06 | End: 2018-10-19 | Stop reason: SDUPTHER

## 2018-02-06 RX ORDER — POTASSIUM CHLORIDE 20 MEQ/1
20 TABLET, EXTENDED RELEASE ORAL DAILY
Qty: 30 TAB | Refills: 3 | Status: SHIPPED | OUTPATIENT
Start: 2018-02-06 | End: 2019-03-08

## 2018-02-06 RX ORDER — SIMVASTATIN 40 MG/1
40 TABLET, FILM COATED ORAL
Qty: 30 TAB | Refills: 3 | Status: SHIPPED | OUTPATIENT
Start: 2018-02-06 | End: 2018-02-06 | Stop reason: SDUPTHER

## 2018-02-06 RX ORDER — LEVOTHYROXINE SODIUM 50 UG/1
50 TABLET ORAL
Qty: 30 TAB | Refills: 3 | Status: SHIPPED | OUTPATIENT
Start: 2018-02-06 | End: 2018-02-06 | Stop reason: SDUPTHER

## 2018-02-06 RX ORDER — METFORMIN HYDROCHLORIDE 500 MG/1
500 TABLET ORAL 2 TIMES DAILY WITH MEALS
Qty: 60 TAB | Refills: 4 | Status: SHIPPED | OUTPATIENT
Start: 2018-02-06 | End: 2018-02-06 | Stop reason: SDUPTHER

## 2018-02-06 RX ORDER — PHENOL/SODIUM PHENOLATE
20 AEROSOL, SPRAY (ML) MUCOUS MEMBRANE 2 TIMES DAILY
Qty: 60 TAB | Refills: 3 | Status: SHIPPED | OUTPATIENT
Start: 2018-02-06 | End: 2019-04-11 | Stop reason: SDUPTHER

## 2018-02-06 RX ORDER — METFORMIN HYDROCHLORIDE 500 MG/1
500 TABLET ORAL 2 TIMES DAILY WITH MEALS
Qty: 60 TAB | Refills: 4 | Status: SHIPPED | OUTPATIENT
Start: 2018-02-06 | End: 2019-02-12 | Stop reason: SDUPTHER

## 2018-02-06 RX ORDER — POLYETHYLENE GLYCOL 3350 17 G/17G
17 POWDER, FOR SOLUTION ORAL DAILY
Qty: 850 G | Refills: 3 | Status: SHIPPED | OUTPATIENT
Start: 2018-02-06 | End: 2019-03-08

## 2018-02-06 RX ORDER — POTASSIUM CHLORIDE 20 MEQ/1
20 TABLET, EXTENDED RELEASE ORAL DAILY
Qty: 30 TAB | Refills: 3 | Status: SHIPPED | OUTPATIENT
Start: 2018-02-06 | End: 2018-02-06 | Stop reason: SDUPTHER

## 2018-02-06 RX ORDER — HYDROCHLOROTHIAZIDE 25 MG/1
25 TABLET ORAL DAILY
Qty: 30 TAB | Refills: 3 | Status: SHIPPED | OUTPATIENT
Start: 2018-02-06 | End: 2018-12-16 | Stop reason: SDUPTHER

## 2018-02-06 RX ORDER — POLYETHYLENE GLYCOL 3350 17 G/17G
17 POWDER, FOR SOLUTION ORAL DAILY
Qty: 850 G | Refills: 3 | Status: SHIPPED | OUTPATIENT
Start: 2018-02-06 | End: 2018-02-06 | Stop reason: SDUPTHER

## 2018-02-06 RX ORDER — LOSARTAN POTASSIUM 50 MG/1
50 TABLET ORAL 2 TIMES DAILY
Qty: 60 TAB | Refills: 0 | Status: SHIPPED | OUTPATIENT
Start: 2018-02-06 | End: 2018-02-06 | Stop reason: SDUPTHER

## 2018-02-06 NOTE — MR AVS SNAPSHOT
Daniel Bustillo 
 
 
 [address] [place] [id number] 
[phone] Patient: Bola Pedersen MRN: [id number]:[id number] Visit Information Date & Time Provider Department Dept. Phone Encounter #  
 2/6/2018 12:30 PM [id number], [address] [facility] [phone] Follow-up Instructions Return in about 6 months (around 8/6/2018). Upcoming Health Maintenance Date Due Pneumococcal 19-64 Medium Risk (1 of 1 - PPSV23) 9/2/1993 PAP AKA CERVICAL CYTOLOGY 8/12/2018 DTaP/Tdap/Td series (2 - Td) 7/1/2024 Allergies as of 2/6/2018  Review Complete On: 2/6/2018 By: Tim Cosme LPN Severity Noted Reaction Type Reactions Methadone  11/30/2012    Other (comments) Hullucinations,spacy feeling Morphine  11/21/2011    Other (comments) Mood swings,suicidal thoughts Pcn [Penicillins]    Rash Tramadol  11/21/2011    Rash And suicidal thoughts Current Immunizations  Reviewed on 7/1/2014 Name Date Tdap 7/1/2014  2:22 PM  
  
 Not reviewed this visit You Were Diagnosed With   
  
 Codes Comments Essential hypertension with goal blood pressure less than 140/90    -  Primary ICD-10-CM: I10 
ICD-9-CM: 401.9 Hidradenitis suppurativa     ICD-10-CM: L73.2 ICD-9-CM: 705.83 Hypothyroidism due to acquired atrophy of thyroid     ICD-10-CM: E03.4 ICD-9-CM: 244.8, 246.8 Hypercholesteremia     ICD-10-CM: E78.00 ICD-9-CM: 272.0 Hypokalemia     ICD-10-CM: E87.6 ICD-9-CM: 276.8 Morbid obesity due to excess calories (HCC)     ICD-10-CM: E66.01 
ICD-9-CM: 278.01 Gastroesophageal reflux disease without esophagitis     ICD-10-CM: K21.9 ICD-9-CM: 530.81 Constipation, unspecified constipation type     ICD-10-CM: K59.00 ICD-9-CM: 564.00 Breast cancer screening     ICD-10-CM: Z12.31 
ICD-9-CM: V76.10 Other allergic rhinitis     ICD-10-CM: J30.89 ICD-9-CM: 477.8 Vitals BP Pulse Temp Resp Height(growth percentile) Weight(growth percentile) 146/90 99 98.9 °F (37.2 °C) (Oral) 18 5' 8\" (1.727 m) 220 lb (99.8 kg) LMP SpO2 BMI OB Status Smoking Status 09/25/2017 (Approximate) 98% 33.45 kg/m2 Polycystic Ovarian Syndrome Current Every Day Smoker Vitals History BMI and BSA Data Body Mass Index Body Surface Area  
 33.45 kg/m 2 2.19 m 2 Preferred Pharmacy Pharmacy Name Phone [address] [facility], [address] [place] [address] [phone] Your Updated Medication List  
  
   
This list is accurate as of: 2/6/18  1:16 PM.  Always use your most recent med list.  
  
  
  
  
 amitriptyline 150 mg tablet Commonly known as:  ELAVIL Take 150 mg by mouth nightly. clindamycin 300 mg capsule Commonly known as:  CLEOCIN Take 1 Cap by mouth two (2) times a day for 7 days. clobetasol 0.05 % ointment Commonly known as:  Rexie Binder Apply  to affected area two (2) times a day. doxycycline 100 mg capsule Commonly known as:  Natalia Turkmen Take 1 Cap by mouth two (2) times a day. ergocalciferol 50,000 unit capsule Commonly known as:  ERGOCALCIFEROL Take 1 Cap by mouth two (2) times a week for 30 days. Indications: VITAMIN D DEFICIENCY  
  
 fluticasone 50 mcg/actuation nasal spray Commonly known as:  Cheron Janneth 2 Sprays by Both Nostrils route daily. * hydroCHLOROthiazide 25 mg tablet Commonly known as:  HYDRODIURIL Take 1 Tab by mouth daily for 30 days. Indications: HYPERTENSION  
  
 * hydroCHLOROthiazide 25 mg tablet Commonly known as:  HYDRODIURIL Take 1 Tab by mouth daily. * HYDROmorphone 8 mg tablet Commonly known as:  DILAUDID Take 8 mg by mouth every four (4) hours as needed for Pain. * HYDROmorphone 8 mg tablet Commonly known as:  DILAUDID Take 1.5 Tabs by mouth four (4) times daily as needed for Pain for 30 days. FILL ON OR AFTER: 10/18/13  Indications: PAIN  
  
 * HYDROmorphone 8 mg tablet Commonly known as:  DILAUDID Take 1.5 tablets by mouth four (4) times daily as needed for Pain for up to 30 days. Indications: PAIN  
  
 * HYDROmorphone 8 mg tablet Commonly known as:  DILAUDID Take 1.5 tablets by mouth four (4) times daily as needed for Pain for up to 30 days. Indications: PAIN  
  
 * HYDROmorphone 8 mg tablet Commonly known as:  DILAUDID Take 1.5 Tabs by mouth four (4) times daily as needed for Pain for up to 30 days. Indications: PAIN  
  
 * HYDROmorphone 8 mg tablet Commonly known as:  DILAUDID Take 1.5 Tabs by mouth four (4) times daily as needed for Pain for up to 30 days. Indications: PAIN KlonoPIN 1 mg tablet Generic drug:  clonazePAM  
Take 1 mg by mouth four (4) times daily. LaMICtal 200 mg tablet Generic drug:  lamoTRIgine Take 200 mg by mouth daily. levothyroxine 50 mcg tablet Commonly known as:  SYNTHROID Take 1 Tab by mouth Daily (before breakfast). LINZESS 145 mcg Cap capsule Generic drug:  linaclotide Take  by mouth Daily (before breakfast). lorcaserin 10 mg Tab Commonly known as:  Belvia Course Take 10 mg by mouth two (2) times a day. Max Daily Amount: 20 mg.  
  
 losartan 50 mg tablet Commonly known as:  COZAAR Take 1 Tab by mouth two (2) times a day. mefenamic acid 250 mg capsule Commonly known as:  PONSTEL Take 1 Cap by mouth every six (6) hours. metFORMIN 500 mg tablet Commonly known as:  GLUCOPHAGE Take 1 Tab by mouth two (2) times daily (with meals). Omeprazole delayed release 20 mg tablet Commonly known as:  PRILOSEC D/R Take 1 Tab by mouth two (2) times a day. * oxyCODONE CR 40 mg CR tablet Commonly known as:  OxyCONTIN Take 1 Tab by mouth every six (6) hours for 30 days.  For chronic pain Fill on or after: 4/11/14  Indications: NEUROPATHIC PAIN, PAIN  
  
 * oxyCODONE CR 40 mg CR tablet Commonly known as:  OxyCONTIN Take 1 Tab by mouth every six (6) hours for 30 days. For chronic pain Fill on or after: 5/9/14  Indications: NEUROPATHIC PAIN, PAIN  
  
 * oxyCODONE CR 40 mg CR tablet Commonly known as:  OxyCONTIN Take 1 Tab by mouth every six (6) hours for 30 days. For chronic pain Fill on or after: 6/7/14  Indications: NEUROPATHIC PAIN, PAIN  
  
 * oxyCODONE CR 40 mg CR tablet Commonly known as:  OxyCONTIN Take 1 Tab by mouth every six (6) hours for 30 days. For chronic pain Fill on or after: 7/5/14  Indications: CHRONIC PAIN, SEVERE PAIN  
  
 * oxyCODONE CR 40 mg CR tablet Commonly known as:  OxyCONTIN Take 1 Tab by mouth every six (6) hours for 30 days. For chronic pain Fill on or after: 8/2/14  Indications: CHRONIC PAIN, SEVERE PAIN  
  
 * oxyCODONE CR 40 mg CR tablet Commonly known as:  OxyCONTIN Take 1 Tab by mouth every six (6) hours for 30 days. For chronic pain Fill on or after: 9/1/14  Indications: CHRONIC PAIN, SEVERE PAIN  
  
 * oxyCODONE CR 60 mg CR tablet Commonly known as:  OxyCONTIN Take 1 tablet by mouth every six (6) hours for 30 days. Indications: CHRONIC PAIN WITH OPIOID TOLERANCE, SEVERE PAIN WITH OPIOID TOLERANCE  
  
 * oxyCODONE CR 60 mg CR tablet Commonly known as:  OxyCONTIN Take 1 tablet by mouth every six (6) hours for 30 days. For chronic pain  Indications: CHRONIC PAIN WITH OPIOID TOLERANCE, SEVERE PAIN WITH OPIOID TOLERANCE  
  
 * oxyCODONE CR 60 mg CR tablet Commonly known as:  OxyCONTIN Take 1 tablet by mouth every six (6) hours for 30 days. Indications: CHRONIC PAIN WITH OPIOID TOLERANCE, SEVERE PAIN WITH OPIOID TOLERANCE  
  
 * oxyCODONE IR 30 mg immediate release tablet Commonly known as:  Lizeth Jessie Take 1 Tab by mouth four (4) times daily as needed for Pain. Max Daily Amount: 120 mg.  
  
 * oxyCODONE CR 60 mg CR tablet Commonly known as:  OxyCONTIN  
 Take 1 Tab by mouth every six (6) hours for 30 days. Indications: CHRONIC PAIN WITH OPIOID TOLERANCE, SEVERE PAIN WITH OPIOID TOLERANCE  
  
 * oxyCODONE IR 15 mg immediate release tablet Commonly known as:  OXY-IR  
  
 * OxyCONTIN 20 mg ER tablet Generic drug:  oxyCODONE ER  
  
 polyethylene glycol 17 gram/dose powder Commonly known as:  Kathy Bethany Take 17 g by mouth daily. potassium chloride 20 mEq tablet Commonly known as:  K-DUR, KLOR-CON Take 1 Tab by mouth daily. predniSONE 10 mg dose pack Commonly known as:  STERAPRED DS See administration instruction per 10mg dose pack  
  
 simvastatin 40 mg tablet Commonly known as:  ZOCOR Take 1 Tab by mouth nightly. SUMAtriptan 50 mg tablet Commonly known as:  IMITREX Take 1 Tab by mouth once as needed for Migraine for up to 1 dose. TRAZODONE PO Take 30 mg by mouth nightly as needed. WELLBUTRIN  mg SR tablet Generic drug:  buPROPion SR Take 400 mg by mouth daily. * Notice: This list has 21 medication(s) that are the same as other medications prescribed for you. Read the directions carefully, and ask your doctor or other care provider to review them with you. Prescriptions Sent to Pharmacy Refills  
 clindamycin (CLEOCIN) 300 mg capsule 0 Sig: Take 1 Cap by mouth two (2) times a day for 7 days. Class: Normal  
 Pharmacy: [address] [place] [address] Ph #: [phone] Route: Oral  
 losartan (COZAAR) 50 mg tablet 0 Sig: Take 1 Tab by mouth two (2) times a day. Class: Normal  
 Pharmacy: [address] [place] [address] Ph #: [phone] Route: Oral  
 levothyroxine (SYNTHROID) 50 mcg tablet 3 Sig: Take 1 Tab by mouth Daily (before breakfast).   
 Class: Normal  
 Pharmacy: [address] [place] [address], [address] [place] [address] [address] Ph #: [phone] Route: Oral  
 hydroCHLOROthiazide (HYDRODIURIL) 25 mg tablet 3 Sig: Take 1 Tab by mouth daily. Class: Normal  
 Pharmacy: [address] [place] [address] Ph #: [phone] Route: Oral  
 simvastatin (ZOCOR) 40 mg tablet 3 Sig: Take 1 Tab by mouth nightly. Class: Normal  
 Pharmacy: [address] [place] [address] Ph #: [phone] Route: Oral  
 potassium chloride (K-DUR, KLOR-CON) 20 mEq tablet 3 Sig: Take 1 Tab by mouth daily. Class: Normal  
 Pharmacy: [address] [place] [address] Ph #: [phone] Route: Oral  
 metFORMIN (GLUCOPHAGE) 500 mg tablet 4 Sig: Take 1 Tab by mouth two (2) times daily (with meals). Class: Normal  
 Pharmacy: [address] [place] [address] Ph #: [phone] Route: Oral  
 Omeprazole delayed release (PRILOSEC D/R) 20 mg tablet 3 Sig: Take 1 Tab by mouth two (2) times a day. Class: Normal  
 Pharmacy: [address] [place] [address] Ph #: [phone] Route: Oral  
 polyethylene glycol (MIRALAX) 17 gram/dose powder 3 Sig: Take 17 g by mouth daily. Class: Normal  
 Pharmacy: [address] [place] [address] Ph #: [phone] Route: Oral  
 fluticasone (FLONASE) 50 mcg/actuation nasal spray 4 Si Sprays by Both Nostrils route daily. Class: Normal  
 Pharmacy: [address] [place] [address] Ph #: [phone] Route: Both Nostrils Follow-up Instructions Return in about 6 months (around 2018). To-Do List   
 2018 Imaging:  BENJI MAMMO BI SCREENING INCL CAD Introducing \[facility]\"" & HEALTH SERVICES! Fabio Ny introduces Spiced Bits patient portal. Now you can access parts of your medical record, email your doctor's office, and request medication refills online. 1. In your internet browser, go to [email or web address] 2. Click on the First Time User? Click Here link in the Sign In box. You will see the New Member Sign Up page. 3. Enter your Spiced Bits Access Code exactly as it appears below. You will not need to use this code after youve completed the sign-up process. If you do not sign up before the expiration date, you must request a new code. · [employer]its Access Code: [email or web address] Expires: 5/7/2018  1:16 PM 
 
4. Enter the last four digits of your Social Security Number (xxxx) and Date of Birth (mm/dd/yyyy) as indicated and click Submit. You will be taken to the next sign-up page. 5. Create a Spiced Bits ID. This will be your Sp[employer] Bits login ID and cannot be changed, so think of one that is secure and easy to remember. 6. Create a Sp[employer]its password. You can change your password at any time. 7. Enter your Password Reset Question and Answer. This can be used at a later time if you forget your password. 8. Enter your e-mail address. You will receive e-mail notification when new information is available in [address] 9. Click Sign Up. You can now view and download portions of your medical record. 10. Click the Download Summary menu link to download a portable copy of your medical information. If you have questions, please visit the Frequently Asked Questions section of the Spiced [employer]its website. Remember, Spiced Bits is NOT to be used for urgent needs. For medical emergencies, dial 911. Now available from your iPhone and Android! Please provide this summary of care documentation to your next provider. Your primary care clinician is listed as Camila Arnett. If you have any questions after today's visit, please call [phone].

## 2018-02-06 NOTE — PROGRESS NOTES
1. Have you been to the ER, urgent care clinic since your last visit? Hospitalized since your last visit? No    2. Have you seen or consulted any other health care providers outside of the [address] [place] [address] since your last visit? Include any pap smears or colon screening.  No

## 2018-02-07 NOTE — PROGRESS NOTES
Val Chávez is a 36 y.o.  female and presents with     Chief Complaint   Patient presents with    Hypertension    Diabetes    Cholesterol Problem    Skin Infection       Pt says she has cyst in her rt breast and sometimes she gets discharge after it bursts open. Pt says she is taking meds for hTN,DM and chol. She sees psych and pain management. No headaches or dizziness. She needs refills on all her meds. Past Medical History:   Diagnosis Date    ADD (attention deficit disorder)     Chronic pain     back    DDD (degenerative disc disease)     Depression     Hypercholesterolemia     Hypertension     Psychotic disorder     Reflex sympathetic dystrophy of the lower limb 7/27/2011    RSD (reflex sympathetic dystrophy)     RSD (reflex sympathetic dystrophy)     Sacralization     L5    Sprained ankle     Thyroid disease      History reviewed. No pertinent surgical history. Current Outpatient Prescriptions   Medication Sig    clindamycin (CLEOCIN) 300 mg capsule Take 1 Cap by mouth two (2) times a day for 7 days.  losartan (COZAAR) 50 mg tablet Take 1 Tab by mouth two (2) times a day.  levothyroxine (SYNTHROID) 50 mcg tablet Take 1 Tab by mouth Daily (before breakfast).  hydroCHLOROthiazide (HYDRODIURIL) 25 mg tablet Take 1 Tab by mouth daily.  simvastatin (ZOCOR) 40 mg tablet Take 1 Tab by mouth nightly.  potassium chloride (K-DUR, KLOR-CON) 20 mEq tablet Take 1 Tab by mouth daily.  metFORMIN (GLUCOPHAGE) 500 mg tablet Take 1 Tab by mouth two (2) times daily (with meals).  Omeprazole delayed release (PRILOSEC D/R) 20 mg tablet Take 1 Tab by mouth two (2) times a day.  polyethylene glycol (MIRALAX) 17 gram/dose powder Take 17 g by mouth daily.  fluticasone (FLONASE) 50 mcg/actuation nasal spray 2 Sprays by Both Nostrils route daily.  clobetasol (TEMOVATE) 0.05 % ointment Apply  to affected area two (2) times a day.     predniSONE (STERAPRED DS) 10 mg dose pack See administration instruction per 10mg dose pack    doxycycline (MONODOX) 100 mg capsule Take 1 Cap by mouth two (2) times a day.  HYDROmorphone (DILAUDID) 8 mg tablet Take 8 mg by mouth every four (4) hours as needed for Pain.  linaclotide (LINZESS) 145 mcg cap capsule Take  by mouth Daily (before breakfast).  mefenamic acid (PONSTEL) 250 mg capsule Take 1 Cap by mouth every six (6) hours.  oxyCODONE IR (OXY-IR) 15 mg immediate release tablet     OXYCONTIN 20 mg ER tablet     hydrochlorothiazide (HYDRODIURIL) 25 mg tablet Take 1 Tab by mouth daily for 30 days. Indications: HYPERTENSION    lorcaserin (BELVIQ) 10 mg tab Take 10 mg by mouth two (2) times a day. Max Daily Amount: 20 mg.    oxyCODONE IR (OXY-IR) 30 mg immediate release tablet Take 1 Tab by mouth four (4) times daily as needed for Pain. Max Daily Amount: 120 mg.    oxyCODONE CR (OXYCONTIN) 60 mg CR tablet Take 1 Tab by mouth every six (6) hours for 30 days. Indications: CHRONIC PAIN WITH OPIOID TOLERANCE, SEVERE PAIN WITH OPIOID TOLERANCE    HYDROmorphone (DILAUDID) 8 mg tablet Take 1.5 Tabs by mouth four (4) times daily as needed for Pain for up to 30 days. Indications: PAIN    HYDROmorphone (DILAUDID) 8 mg tablet Take 1.5 Tabs by mouth four (4) times daily as needed for Pain for up to 30 days. Indications: PAIN    SUMAtriptan (IMITREX) 50 mg tablet Take 1 Tab by mouth once as needed for Migraine for up to 1 dose.  HYDROmorphone (DILAUDID) 8 mg tablet Take 1.5 tablets by mouth four (4) times daily as needed for Pain for up to 30 days. Indications: PAIN    HYDROmorphone (DILAUDID) 8 mg tablet Take 1.5 tablets by mouth four (4) times daily as needed for Pain for up to 30 days. Indications: PAIN    oxyCODONE CR (OXYCONTIN) 60 mg CR tablet Take 1 tablet by mouth every six (6) hours for 30 days.  Indications: CHRONIC PAIN WITH OPIOID TOLERANCE, SEVERE PAIN WITH OPIOID TOLERANCE    oxyCODONE CR (OXYCONTIN) 60 mg CR tablet Take 1 tablet by mouth every six (6) hours for 30 days. For chronic pain  Indications: CHRONIC PAIN WITH OPIOID TOLERANCE, SEVERE PAIN WITH OPIOID TOLERANCE    oxyCODONE CR (OXYCONTIN) 60 mg CR tablet Take 1 tablet by mouth every six (6) hours for 30 days. Indications: CHRONIC PAIN WITH OPIOID TOLERANCE, SEVERE PAIN WITH OPIOID TOLERANCE    oxyCODONE CR (OXYCONTIN) 40 mg CR tablet Take 1 Tab by mouth every six (6) hours for 30 days. For chronic pain  Fill on or after:  9/1/14  Indications: CHRONIC PAIN, SEVERE PAIN    oxyCODONE CR (OXYCONTIN) 40 mg CR tablet Take 1 Tab by mouth every six (6) hours for 30 days. For chronic pain  Fill on or after:  8/2/14  Indications: CHRONIC PAIN, SEVERE PAIN    oxyCODONE CR (OXYCONTIN) 40 mg CR tablet Take 1 Tab by mouth every six (6) hours for 30 days. For chronic pain  Fill on or after:  7/5/14  Indications: CHRONIC PAIN, SEVERE PAIN    oxyCODONE CR (OXYCONTIN) 40 mg CR tablet Take 1 Tab by mouth every six (6) hours for 30 days. For chronic pain  Fill on or after:  6/7/14  Indications: NEUROPATHIC PAIN, PAIN    oxyCODONE CR (OXYCONTIN) 40 mg CR tablet Take 1 Tab by mouth every six (6) hours for 30 days. For chronic pain  Fill on or after:  5/9/14  Indications: NEUROPATHIC PAIN, PAIN    oxyCODONE CR (OXYCONTIN) 40 mg CR tablet Take 1 Tab by mouth every six (6) hours for 30 days. For chronic pain  Fill on or after:  4/11/14  Indications: NEUROPATHIC PAIN, PAIN    amitriptyline (ELAVIL) 150 mg tablet Take 150 mg by mouth nightly.  HYDROmorphone (DILAUDID) 8 mg tablet Take 1.5 Tabs by mouth four (4) times daily as needed for Pain for 30 days. FILL ON OR AFTER:  10/18/13  Indications: PAIN    ergocalciferol (ERGOCALCIFEROL) 50,000 unit capsule Take 1 Cap by mouth two (2) times a week for 30 days. Indications: VITAMIN D DEFICIENCY    TRAZODONE HCL (TRAZODONE PO) Take 30 mg by mouth nightly as needed.  buPROPion SR (WELLBUTRIN SR) 200 mg SR tablet Take 400 mg by mouth daily.     clonazepam (KLONOPIN) 1 mg tablet Take 1 mg by mouth four (4) times daily.  lamotrigine (LAMICTAL) 200 mg tablet Take 200 mg by mouth daily. No current facility-administered medications for this visit. Health Maintenance   Topic Date Due    Pneumococcal 19-64 Medium Risk (1 of 1 - PPSV23) 09/02/1993    PAP AKA CERVICAL CYTOLOGY  08/12/2018    DTaP/Tdap/Td series (2 - Td) 07/01/2024    Influenza Age 9 to Adult  Completed     Immunization History   Administered Date(s) Administered    Tdap 07/01/2014     Patient's last menstrual period was 09/25/2017 (approximate). Allergies and Intolerances: Allergies   Allergen Reactions    Methadone Other (comments)     Hullucinations,spacy feeling      Morphine Other (comments)     Mood swings,suicidal thoughts    Pcn [Penicillins] Rash    Tramadol Rash     And suicidal thoughts       Family History:   Family History   Problem Relation Age of Onset    Diabetes Mother     Heart Disease Father     Stroke Father     Breast Cancer Paternal Grandmother        Social History:   She  reports that she has been smoking Cigarettes. She has a 6.90 pack-year smoking history. She has never used smokeless tobacco.  She  reports that she does not drink alcohol.             Review of Systems:   General: negative for - chills, fatigue, fever, weight change  Psych: negative for - anxiety, depression, irritability or mood swings  ENT: negative for - headaches, hearing change, nasal congestion, oral lesions, sneezing or sore throat  Heme/ Lymph: negative for - bleeding problems, bruising, pallor or swollen lymph nodes  Endo: negative for - hot flashes, polydipsia/polyuria or temperature intolerance  Resp: negative for - cough, shortness of breath or wheezing  CV: negative for - chest pain, edema or palpitations  GI: negative for - abdominal pain, change in bowel habits, constipation, diarrhea or nausea/vomiting  : negative for - dysuria, hematuria, incontinence, pelvic pain or vulvar/vaginal symptoms  MSK: negative for - joint pain, joint swelling or muscle pain  Neuro: negative for - confusion, headaches, seizures or weakness  Derm: negative for - dry skin, hair changes, rash or skin lesion changes, pos for breast lesion          Physical:   Vitals:   Vitals:    02/06/18 1240   BP: 146/90   Pulse: 99   Resp: 18   Temp: 98.9 °F (37.2 °C)   TempSrc: Oral   SpO2: 98%   Weight: 220 lb (99.8 kg)   Height: 5' 8\" (1.727 m)           Exam:   HEENT- atraumatic,normocephalic, awake, oriented, well nourished  Neck - supple,no enlarged lymph nodes, no JVD, no thyromegaly  Chest- CTA, no rhonchi, no crackles  Heart- rrr, no murmurs / gallop/rub  Abdomen- soft,BS+,NT, no hepatosplenomegaly  Ext - no c/c/edema   Neuro- no focal deficits. Power 5/5 all extremities  Skin - warm,dry, no obvious rashes. Rt breast - no obvious lesion on rt Galilea ramirez, nurse present during exam)      Review of Data:   LABS:   Lab Results   Component Value Date/Time    WBC 12.7 08/23/2017 03:48 PM    HGB 14.1 08/23/2017 03:48 PM    HCT 43.3 08/23/2017 03:48 PM    PLATELET 737 03/35/3337 03:48 PM     Lab Results   Component Value Date/Time    Sodium 138 08/23/2017 03:48 PM    Potassium 3.4 (L) 08/23/2017 03:48 PM    Chloride 99 (L) 08/23/2017 03:48 PM    CO2 29 08/23/2017 03:48 PM    Glucose 107 (H) 08/23/2017 03:48 PM    BUN 18 08/23/2017 03:48 PM    Creatinine 1.00 08/23/2017 03:48 PM     Lab Results   Component Value Date/Time    Cholesterol, total 175 08/23/2017 03:48 PM    HDL Cholesterol 59 08/23/2017 03:48 PM    LDL, calculated 84.8 08/23/2017 03:48 PM    Triglyceride 156 (H) 08/23/2017 03:48 PM     No results found for: GPT        Impression / Plan:        ICD-10-CM ICD-9-CM    1. Essential hypertension with goal blood pressure less than 140/90 I10 401.9 losartan (COZAAR) 50 mg tablet      DISCONTINUED: losartan (COZAAR) 50 mg tablet   2.  Hidradenitis suppurativa L73.2 705.83 clindamycin (CLEOCIN) 300 mg capsule      DISCONTINUED: clindamycin (CLEOCIN) 300 mg capsule   3. Hypothyroidism due to acquired atrophy of thyroid E03.4 244.8 levothyroxine (SYNTHROID) 50 mcg tablet     246.8 DISCONTINUED: levothyroxine (SYNTHROID) 50 mcg tablet   4. Hypercholesteremia E78.00 272.0 hydroCHLOROthiazide (HYDRODIURIL) 25 mg tablet      simvastatin (ZOCOR) 40 mg tablet      DISCONTINUED: hydroCHLOROthiazide (HYDRODIURIL) 25 mg tablet      DISCONTINUED: simvastatin (ZOCOR) 40 mg tablet   5. Hypokalemia E87.6 276.8 potassium chloride (K-DUR, KLOR-CON) 20 mEq tablet      DISCONTINUED: potassium chloride (K-DUR, KLOR-CON) 20 mEq tablet   6. Morbid obesity due to excess calories (HCC) E66.01 278.01 metFORMIN (GLUCOPHAGE) 500 mg tablet      DISCONTINUED: metFORMIN (GLUCOPHAGE) 500 mg tablet   7. Gastroesophageal reflux disease without esophagitis K21.9 530.81 Omeprazole delayed release (PRILOSEC D/R) 20 mg tablet      DISCONTINUED: Omeprazole delayed release (PRILOSEC D/R) 20 mg tablet   8. Constipation, unspecified constipation type K59.00 564.00 polyethylene glycol (MIRALAX) 17 gram/dose powder      DISCONTINUED: polyethylene glycol (MIRALAX) 17 gram/dose powder   9. Breast cancer screening Z12.31 V76.10 [facility] MAMMO BI SCREENING INCL CAD   8. Other allergic rhinitis J30.89 477.8 fluticasone (FLONASE) 50 mcg/actuation nasal spray     DM/HTN/Hyperhchol / hypothyroidism - stable. Explained to patient risk benefits of the medications. Advised patient to stop meds if having any side effects. Pt verbalized understanding of the instructions. I have discussed the diagnosis with the patient and the intended plan as seen in the above orders. The patient has received an after-visit summary and questions were answered concerning future plans. I have discussed medication side effects and warnings with the patient as well.  I have reviewed the plan of care with the patient, accepted their input and they are in agreement with the treatment goals.     Reviewed plan of care. Patient has provided input and agrees with goals. Follow-up Disposition:  Return in about 6 months (around 8/6/2018).     Camila Arnett MD

## 2018-02-09 ENCOUNTER — HOSPITAL ENCOUNTER (OUTPATIENT)
Dept: LAB | Age: 43
Discharge: HOME | End: 2018-02-09
Payer: MEDICARE

## 2018-02-09 PROCEDURE — 87624 HPV HI-RISK TYP POOLED RSLT: CPT | Performed by: OBSTETRICS & GYNECOLOGY

## 2018-02-09 PROCEDURE — 88175 CYTOPATH C/V AUTO FLUID REDO: CPT | Performed by: OBSTETRICS & GYNECOLOGY

## 2018-02-19 ENCOUNTER — HOSPITAL ENCOUNTER (OUTPATIENT)
Dept: MAMMOGRAPHY | Age: 43
Discharge: HOME | End: 2018-02-19
Attending: INTERNAL MEDICINE
Payer: MEDICARE

## 2018-02-19 DIAGNOSIS — Z12.39 BREAST CANCER SCREENING: ICD-10-CM

## 2018-02-19 PROCEDURE — 77067 SCR MAMMO BI INCL CAD: CPT

## 2018-04-12 DIAGNOSIS — I10 ESSENTIAL HYPERTENSION WITH GOAL BLOOD PRESSURE LESS THAN 140/90: ICD-10-CM

## 2018-04-12 RX ORDER — LOSARTAN POTASSIUM 50 MG/1
TABLET ORAL
Qty: 60 TAB | Refills: 0 | Status: SHIPPED | OUTPATIENT
Start: 2018-04-12 | End: 2018-06-18 | Stop reason: SDUPTHER

## 2018-06-06 ENCOUNTER — HOSPITAL ENCOUNTER (OUTPATIENT)
Dept: LAB | Age: 43
Discharge: HOME | End: 2018-06-06
Payer: MEDICARE

## 2018-06-06 ENCOUNTER — OFFICE VISIT (OUTPATIENT)
Dept: FAMILY MEDICINE CLINIC | Age: 43
End: 2018-06-06

## 2018-06-06 VITALS
WEIGHT: 228 LBS | TEMPERATURE: 98.2 F | HEART RATE: 102 BPM | HEIGHT: 68 IN | SYSTOLIC BLOOD PRESSURE: 155 MMHG | RESPIRATION RATE: 16 BRPM | DIASTOLIC BLOOD PRESSURE: 90 MMHG | OXYGEN SATURATION: 98 % | BODY MASS INDEX: 34.56 KG/M2

## 2018-06-06 DIAGNOSIS — E03.4 HYPOTHYROIDISM DUE TO ACQUIRED ATROPHY OF THYROID: ICD-10-CM

## 2018-06-06 DIAGNOSIS — N95.1 PERI-MENOPAUSAL: ICD-10-CM

## 2018-06-06 DIAGNOSIS — E78.00 HYPERCHOLESTEREMIA: ICD-10-CM

## 2018-06-06 DIAGNOSIS — M72.2 PLANTAR FASCIITIS, RIGHT: ICD-10-CM

## 2018-06-06 DIAGNOSIS — N91.2 AMENORRHEA: ICD-10-CM

## 2018-06-06 DIAGNOSIS — I10 ESSENTIAL HYPERTENSION: ICD-10-CM

## 2018-06-06 DIAGNOSIS — L56.8 PHOTOSENSITIVITY: ICD-10-CM

## 2018-06-06 DIAGNOSIS — R73.01 IFG (IMPAIRED FASTING GLUCOSE): ICD-10-CM

## 2018-06-06 DIAGNOSIS — K59.00 CONSTIPATION, UNSPECIFIED CONSTIPATION TYPE: ICD-10-CM

## 2018-06-06 DIAGNOSIS — Z00.00 MEDICARE ANNUAL WELLNESS VISIT, SUBSEQUENT: ICD-10-CM

## 2018-06-06 DIAGNOSIS — R68.2 DRY MOUTH: Primary | ICD-10-CM

## 2018-06-06 LAB
ALBUMIN SERPL-MCNC: 3.7 G/DL (ref 3.4–5)
ALBUMIN/GLOB SERPL: 0.9 {RATIO} (ref 0.8–1.7)
ALP SERPL-CCNC: 107 U/L (ref 45–117)
ALT SERPL-CCNC: 30 U/L (ref 13–56)
ANION GAP SERPL CALC-SCNC: 6 MMOL/L (ref 3–18)
AST SERPL-CCNC: 30 U/L (ref 15–37)
BILIRUB SERPL-MCNC: 0.1 MG/DL (ref 0.2–1)
BUN SERPL-MCNC: 15 MG/DL (ref 7–18)
BUN/CREAT SERPL: 15 (ref 12–20)
CALCIUM SERPL-MCNC: 9.3 MG/DL (ref 8.5–10.1)
CHLORIDE SERPL-SCNC: 100 MMOL/L (ref 100–108)
CHOLEST SERPL-MCNC: 191 MG/DL
CO2 SERPL-SCNC: 30 MMOL/L (ref 21–32)
CREAT SERPL-MCNC: 1 MG/DL (ref 0.6–1.3)
EST. AVERAGE GLUCOSE BLD GHB EST-MCNC: 123 MG/DL
GLOBULIN SER CALC-MCNC: 3.9 G/DL (ref 2–4)
GLUCOSE SERPL-MCNC: 89 MG/DL (ref 74–99)
HBA1C MFR BLD: 5.9 % (ref 4.2–5.6)
HDLC SERPL-MCNC: 48 MG/DL (ref 40–60)
HDLC SERPL: 4 {RATIO} (ref 0–5)
LDLC SERPL CALC-MCNC: 114.2 MG/DL (ref 0–100)
LIPID PROFILE,FLP: ABNORMAL
POTASSIUM SERPL-SCNC: 4.1 MMOL/L (ref 3.5–5.5)
PROT SERPL-MCNC: 7.6 G/DL (ref 6.4–8.2)
SODIUM SERPL-SCNC: 136 MMOL/L (ref 136–145)
TRIGL SERPL-MCNC: 144 MG/DL (ref ?–150)
TSH SERPL DL<=0.05 MIU/L-ACNC: 3.31 UIU/ML (ref 0.36–3.74)
VLDLC SERPL CALC-MCNC: 28.8 MG/DL

## 2018-06-06 PROCEDURE — 80061 LIPID PANEL: CPT | Performed by: INTERNAL MEDICINE

## 2018-06-06 PROCEDURE — 36415 COLL VENOUS BLD VENIPUNCTURE: CPT | Performed by: INTERNAL MEDICINE

## 2018-06-06 PROCEDURE — 83001 ASSAY OF GONADOTROPIN (FSH): CPT | Performed by: INTERNAL MEDICINE

## 2018-06-06 PROCEDURE — 86038 ANTINUCLEAR ANTIBODIES: CPT | Performed by: INTERNAL MEDICINE

## 2018-06-06 PROCEDURE — 80053 COMPREHEN METABOLIC PANEL: CPT | Performed by: INTERNAL MEDICINE

## 2018-06-06 PROCEDURE — 83036 HEMOGLOBIN GLYCOSYLATED A1C: CPT | Performed by: INTERNAL MEDICINE

## 2018-06-06 PROCEDURE — 84146 ASSAY OF PROLACTIN: CPT | Performed by: INTERNAL MEDICINE

## 2018-06-06 PROCEDURE — 84443 ASSAY THYROID STIM HORMONE: CPT | Performed by: INTERNAL MEDICINE

## 2018-06-06 RX ORDER — POLYETHYLENE GLYCOL 3350 17 G/17G
17 POWDER, FOR SOLUTION ORAL DAILY
Qty: 850 G | Refills: 3 | Status: SHIPPED | OUTPATIENT
Start: 2018-06-06 | End: 2019-03-08

## 2018-06-06 NOTE — PROGRESS NOTES
1. Have you been to the ER, urgent care clinic since your last visit? Hospitalized since your last visit? No    2. Have you seen or consulted any other health care providers outside of the [address] [place] [address] since your last visit? Include any pap smears or colon screening.  No

## 2018-06-06 NOTE — PATIENT INSTRUCTIONS
Medicare Wellness Visit, Female    The best way to live healthy is to have a lifestyle where you eat a well-balanced diet, exercise regularly, limit alcohol use, and quit all forms of tobacco/nicotine, if applicable. Regular preventive services are another way to keep healthy. Preventive services (vaccines, screening tests, monitoring & exams) can help personalize your care plan, which helps you manage your own care. Screening tests can find health problems at the earliest stages, when they are easiest to treat. [facility] follows the current, evidence-based guidelines published by the [facility] ([facility]) when recommending preventive services for our patients. Because we follow these guidelines, sometimes recommendations change over time as research supports it. (For example, mammograms used to be recommended annually. Even though Medicare will still pay for an annual mammogram, the newer guidelines recommend a mammogram every two years for women of average risk.)    Of course, you and your provider may decide to screen more often for some diseases, based on your risk and co-morbidities (chronic disease you are already diagnosed with). Preventive services for you include:    - Medicare offers their members a free annual wellness visit, which is time for you and your primary care provider to discuss and plan for your preventive service needs. Take advantage of this benefit every year!    -All people over age 72 should receive the recommended pneumonia vaccines. Current USPSTF guidelines recommend a series of two vaccines for the best pneumonia protection.     -All adults should have a yearly flu vaccine and a tetanus vaccine every 10 years. All adults age 61 years should receive a shingles vaccine once in their lifetime.      -A bone mass density test is recommended when a woman turns 65 to screen for osteoporosis.  This test is only recommended once as a screening. Some providers will use this same test as a disease monitoring tool if you already have osteoporosis. -All adults age 38-68 years who are overweight should have a diabetes screening test once every three years.     -Other screening tests & preventive services for persons with diabetes include: an eye exam to screen for diabetic retinopathy, a kidney function test, a foot exam, and stricter control over your cholesterol.     -Cardiovascular screening for adults with routine risk involves an electrocardiogram (ECG) at intervals determined by the provider.     -Colorectal cancer screenings should be done for adults age 54-65 years with normal risk. There are a number of acceptable methods of screening for this type of cancer. Each test has its own benefits and drawbacks. Discuss with your provider what is most appropriate for you during your annual wellness visit. The different tests include: colonoscopy (considered the best screening method), a fecal occult blood test, a fecal DNA test, and sigmoidoscopy. -Breast cancer screenings are recommended every other year for women of normal risk age 54-69 years.     -Cervical cancer screenings for women over age 72 are only recommended with certain risk factors.     -All adults born between [facility] should be screened once for Hepatitis C.      Here is a list of your current Health Maintenance items (your personalized list of preventive services) with a due date:  Health Maintenance Due   Topic Date Due    Pneumococcal Vaccine (1 of 1 - PPSV23) 09/02/1993    Annual Well Visit  03/14/2018

## 2018-06-06 NOTE — PROGRESS NOTES
This is the Subsequent Medicare Annual Wellness Exam, performed 12 months or more after the Initial AWV or the last Subsequent AWV    I have reviewed the patient's medical history in detail and updated the computerized patient record. History     Past Medical History:   Diagnosis Date    ADD (attention deficit disorder)     Chronic pain     back    DDD (degenerative disc disease)     Depression     Hypercholesterolemia     Hypertension     Psychotic disorder     Reflex sympathetic dystrophy of the lower limb 7/27/2011    RSD (reflex sympathetic dystrophy)     RSD (reflex sympathetic dystrophy)     Sacralization     L5    Sprained ankle     Thyroid disease       No past surgical history on file. Current Outpatient Prescriptions   Medication Sig Dispense Refill    polyethylene glycol (MIRALAX) 17 gram/dose powder Take 17 g by mouth daily. 850 g 3    levothyroxine (SYNTHROID) 50 mcg tablet Take 1 Tab by mouth Daily (before breakfast). 30 Tab 3    hydroCHLOROthiazide (HYDRODIURIL) 25 mg tablet Take 1 Tab by mouth daily. 30 Tab 3    simvastatin (ZOCOR) 40 mg tablet Take 1 Tab by mouth nightly. 30 Tab 3    losartan (COZAAR) 50 mg tablet take 1 tablet by mouth twice a day 60 Tab 0    potassium chloride (K-DUR, KLOR-CON) 20 mEq tablet Take 1 Tab by mouth daily. 30 Tab 3    metFORMIN (GLUCOPHAGE) 500 mg tablet Take 1 Tab by mouth two (2) times daily (with meals). 60 Tab 4    Omeprazole delayed release (PRILOSEC D/R) 20 mg tablet Take 1 Tab by mouth two (2) times a day. 60 Tab 3    polyethylene glycol (MIRALAX) 17 gram/dose powder Take 17 g by mouth daily. 850 g 3    fluticasone (FLONASE) 50 mcg/actuation nasal spray 2 Sprays by Both Nostrils route daily. 48 g 4    clobetasol (TEMOVATE) 0.05 % ointment Apply  to affected area two (2) times a day.  15 g 3    predniSONE (STERAPRED DS) 10 mg dose pack See administration instruction per 10mg dose pack 21 Tab 0    doxycycline (MONODOX) 100 mg capsule Take 1 Cap by mouth two (2) times a day. 20 Cap 0    HYDROmorphone (DILAUDID) 8 mg tablet Take 8 mg by mouth every four (4) hours as needed for Pain.  linaclotide (LINZESS) 145 mcg cap capsule Take  by mouth Daily (before breakfast).  mefenamic acid (PONSTEL) 250 mg capsule Take 1 Cap by mouth every six (6) hours. 30 Cap 3    oxyCODONE IR (OXY-IR) 15 mg immediate release tablet   0    OXYCONTIN 20 mg ER tablet   0    hydrochlorothiazide (HYDRODIURIL) 25 mg tablet Take 1 Tab by mouth daily for 30 days. Indications: HYPERTENSION 30 Tab 5    lorcaserin (BELVIQ) 10 mg tab Take 10 mg by mouth two (2) times a day. Max Daily Amount: 20 mg. 60 Tab 3    oxyCODONE IR (OXY-IR) 30 mg immediate release tablet Take 1 Tab by mouth four (4) times daily as needed for Pain. Max Daily Amount: 120 mg. 120 Tab 0    oxyCODONE CR (OXYCONTIN) 60 mg CR tablet Take 1 Tab by mouth every six (6) hours for 30 days. Indications: CHRONIC PAIN WITH OPIOID TOLERANCE, SEVERE PAIN WITH OPIOID TOLERANCE 120 Tab 0    HYDROmorphone (DILAUDID) 8 mg tablet Take 1.5 Tabs by mouth four (4) times daily as needed for Pain for up to 30 days. Indications: PAIN 180 Tab 0    HYDROmorphone (DILAUDID) 8 mg tablet Take 1.5 Tabs by mouth four (4) times daily as needed for Pain for up to 30 days. Indications: PAIN 180 Tab 0    SUMAtriptan (IMITREX) 50 mg tablet Take 1 Tab by mouth once as needed for Migraine for up to 1 dose. 6 Tab 2    HYDROmorphone (DILAUDID) 8 mg tablet Take 1.5 tablets by mouth four (4) times daily as needed for Pain for up to 30 days. Indications: PAIN 180 tablet 0    HYDROmorphone (DILAUDID) 8 mg tablet Take 1.5 tablets by mouth four (4) times daily as needed for Pain for up to 30 days. Indications: PAIN 180 tablet 0    oxyCODONE CR (OXYCONTIN) 60 mg CR tablet Take 1 tablet by mouth every six (6) hours for 30 days.  Indications: CHRONIC PAIN WITH OPIOID TOLERANCE, SEVERE PAIN WITH OPIOID TOLERANCE 120 tablet 0    oxyCODONE CR (OXYCONTIN) 60 mg CR tablet Take 1 tablet by mouth every six (6) hours for 30 days. For chronic pain  Indications: CHRONIC PAIN WITH OPIOID TOLERANCE, SEVERE PAIN WITH OPIOID TOLERANCE 120 tablet 0    oxyCODONE CR (OXYCONTIN) 60 mg CR tablet Take 1 tablet by mouth every six (6) hours for 30 days. Indications: CHRONIC PAIN WITH OPIOID TOLERANCE, SEVERE PAIN WITH OPIOID TOLERANCE 120 tablet 0    oxyCODONE CR (OXYCONTIN) 40 mg CR tablet Take 1 Tab by mouth every six (6) hours for 30 days. For chronic pain  Fill on or after:  9/1/14  Indications: CHRONIC PAIN, SEVERE PAIN 120 Tab 0    oxyCODONE CR (OXYCONTIN) 40 mg CR tablet Take 1 Tab by mouth every six (6) hours for 30 days. For chronic pain  Fill on or after:  8/2/14  Indications: CHRONIC PAIN, SEVERE PAIN 120 Tab 0    oxyCODONE CR (OXYCONTIN) 40 mg CR tablet Take 1 Tab by mouth every six (6) hours for 30 days. For chronic pain  Fill on or after:  7/5/14  Indications: CHRONIC PAIN, SEVERE PAIN 120 Tab 0    oxyCODONE CR (OXYCONTIN) 40 mg CR tablet Take 1 Tab by mouth every six (6) hours for 30 days. For chronic pain  Fill on or after:  6/7/14  Indications: NEUROPATHIC PAIN, PAIN 120 Tab 0    oxyCODONE CR (OXYCONTIN) 40 mg CR tablet Take 1 Tab by mouth every six (6) hours for 30 days. For chronic pain  Fill on or after:  5/9/14  Indications: NEUROPATHIC PAIN, PAIN 120 Tab 0    oxyCODONE CR (OXYCONTIN) 40 mg CR tablet Take 1 Tab by mouth every six (6) hours for 30 days. For chronic pain  Fill on or after:  4/11/14  Indications: NEUROPATHIC PAIN, PAIN 120 Tab 0    amitriptyline (ELAVIL) 150 mg tablet Take 150 mg by mouth nightly.  HYDROmorphone (DILAUDID) 8 mg tablet Take 1.5 Tabs by mouth four (4) times daily as needed for Pain for 30 days. FILL ON OR AFTER:  10/18/13  Indications: PAIN 180 Tab 0    ergocalciferol (ERGOCALCIFEROL) 50,000 unit capsule Take 1 Cap by mouth two (2) times a week for 30 days.  Indications: VITAMIN D DEFICIENCY 8 Cap 5    TRAZODONE HCL (TRAZODONE PO) Take 30 mg by mouth nightly as needed.  buPROPion SR (WELLBUTRIN SR) 200 mg SR tablet Take 400 mg by mouth daily.  clonazepam (KLONOPIN) 1 mg tablet Take 1 mg by mouth four (4) times daily.  lamotrigine (LAMICTAL) 200 mg tablet Take 200 mg by mouth daily.        Allergies   Allergen Reactions    Methadone Other (comments)     Hullucinations,spacy feeling      Morphine Other (comments)     Mood swings,suicidal thoughts    Pcn [Penicillins] Rash    Tramadol Rash     And suicidal thoughts     Family History   Problem Relation Age of Onset    Diabetes Mother     Heart Disease Father     Stroke Father     Breast Cancer Paternal Grandmother      Social History   Substance Use Topics    Smoking status: Current Every Day Smoker     Packs/day: 0.30     Years: 23.00     Types: Cigarettes    Smokeless tobacco: Never Used      Comment: \"not ready to quit yet\"    Alcohol use No     Patient Active Problem List   Diagnosis Code    Reflex sympathetic dystrophy of the lower limb G90.529    Other malaise and fatigue R53.81, R53.83    Encounter for long-term (current) use of other medications Z79.899    Pain in limb M79.609    Disturbance of skin sensation R20.9    Spasm of muscle M62.838    Other and unspecified hyperlipidemia E78.5    Bipolar affective disorder (HCC) F31.9    Obesity E66.9    Persistent disorder of initiating or maintaining sleep G47.00    PCOS (polycystic ovarian syndrome) E28.2    Unspecified essential hypertension I10    Unspecified vitamin D deficiency E55.9    Anemia D64.9    Hyperglycemia R73.9    Chronic pain syndrome G89.4    Adverse reaction to narcotic drug T40.605A    Hypercholesteremia E78.00    Hypothyroidism E03.9    HTN (hypertension) I10    Constipation K59.00    Essential hypertension I10       Depression Risk Factor Screening:     PHQ over the last two weeks 1/15/2014   Little interest or pleasure in doing things Not at all   Feeling down, depressed or hopeless Several days   Total Score PHQ 2 1     Alcohol Risk Factor Screening: You do not drink alcohol or very rarely. Functional Ability and Level of Safety:   Hearing Loss  Hearing is good. Activities of Daily Living  The home contains: no safety equipment. Patient does total self care    Fall Risk  Fall Risk Assessment, last 12 mths 1/15/2014   Able to walk? Yes   Fall in past 12 months? No       Abuse Screen  Patient is not abused    Cognitive Screening   Evaluation of Cognitive Function:  Has your family/caregiver stated any concerns about your memory: no  Normal    Patient Care Team   Patient Care Team:  Alice Mann MD as PCP - General (Internal Medicine)  Radha Restrepo MD as Physician (Psychiatry)  Casey Olsen MD (Psychiatry)  Thomas Harrell MD (Pain Management)    Assessment/Plan   Education and counseling provided:  Are appropriate based on today's review and evaluation    Diagnoses and all orders for this visit:    1. Dry mouth    2. Constipation, unspecified constipation type  -     polyethylene glycol (MIRALAX) 17 gram/dose powder; Take 17 g by mouth daily. 3. Rosamaria-menopausal  -     FSH AND LH; Future    4. Hypercholesteremia  -     LIPID PANEL; Future  -     METABOLIC PANEL, COMPREHENSIVE; Future    5. Essential hypertension    6. Hypothyroidism due to acquired atrophy of thyroid  -     TSH 3RD GENERATION; Future    7. IFG (impaired fasting glucose)  -     HEMOGLOBIN A1C WITH EAG;  Future        Health Maintenance Due   Topic Date Due    Pneumococcal 19-64 Medium Risk (1 of 1 - PPSV23) 09/02/1993    MEDICARE YEARLY EXAM  03/14/2018     ---------------------------------------------------------------------------------------------------------------------    Chronic condition    Sandy Reddy is a 36 y.o.  female and presents with     Chief Complaint   Patient presents with    Hypertension    Hypothyroidism    Cholesterol Problem    Diabetes     Pt says her mouth have been dry. This has resulted in tooth decay and she has lost some teeth., She is very emotional about it. Pt does admit that it is from the opiods that she is taking . Pt als ohas hot flashed and thinks that she is going through menopause. She has gained wt. She also feels constipated. Past Medical History:   Diagnosis Date    ADD (attention deficit disorder)     Chronic pain     back    DDD (degenerative disc disease)     Depression     Hypercholesterolemia     Hypertension     Psychotic disorder     Reflex sympathetic dystrophy of the lower limb 7/27/2011    RSD (reflex sympathetic dystrophy)     RSD (reflex sympathetic dystrophy)     Sacralization     L5    Sprained ankle     Thyroid disease      No past surgical history on file. Current Outpatient Prescriptions   Medication Sig    polyethylene glycol (MIRALAX) 17 gram/dose powder Take 17 g by mouth daily.  levothyroxine (SYNTHROID) 50 mcg tablet Take 1 Tab by mouth Daily (before breakfast).  hydroCHLOROthiazide (HYDRODIURIL) 25 mg tablet Take 1 Tab by mouth daily.  simvastatin (ZOCOR) 40 mg tablet Take 1 Tab by mouth nightly.  losartan (COZAAR) 50 mg tablet take 1 tablet by mouth twice a day    potassium chloride (K-DUR, KLOR-CON) 20 mEq tablet Take 1 Tab by mouth daily.  metFORMIN (GLUCOPHAGE) 500 mg tablet Take 1 Tab by mouth two (2) times daily (with meals).  Omeprazole delayed release (PRILOSEC D/R) 20 mg tablet Take 1 Tab by mouth two (2) times a day.  polyethylene glycol (MIRALAX) 17 gram/dose powder Take 17 g by mouth daily.  fluticasone (FLONASE) 50 mcg/actuation nasal spray 2 Sprays by Both Nostrils route daily.  clobetasol (TEMOVATE) 0.05 % ointment Apply  to affected area two (2) times a day.  predniSONE (STERAPRED DS) 10 mg dose pack See administration instruction per 10mg dose pack    doxycycline (MONODOX) 100 mg capsule Take 1 Cap by mouth two (2) times a day.  HYDROmorphone (DILAUDID) 8 mg tablet Take 8 mg by mouth every four (4) hours as needed for Pain.  linaclotide (LINZESS) 145 mcg cap capsule Take  by mouth Daily (before breakfast).  mefenamic acid (PONSTEL) 250 mg capsule Take 1 Cap by mouth every six (6) hours.  oxyCODONE IR (OXY-IR) 15 mg immediate release tablet     OXYCONTIN 20 mg ER tablet     hydrochlorothiazide (HYDRODIURIL) 25 mg tablet Take 1 Tab by mouth daily for 30 days. Indications: HYPERTENSION    lorcaserin (BELVIQ) 10 mg tab Take 10 mg by mouth two (2) times a day. Max Daily Amount: 20 mg.    oxyCODONE IR (OXY-IR) 30 mg immediate release tablet Take 1 Tab by mouth four (4) times daily as needed for Pain. Max Daily Amount: 120 mg.    oxyCODONE CR (OXYCONTIN) 60 mg CR tablet Take 1 Tab by mouth every six (6) hours for 30 days. Indications: CHRONIC PAIN WITH OPIOID TOLERANCE, SEVERE PAIN WITH OPIOID TOLERANCE    HYDROmorphone (DILAUDID) 8 mg tablet Take 1.5 Tabs by mouth four (4) times daily as needed for Pain for up to 30 days. Indications: PAIN    HYDROmorphone (DILAUDID) 8 mg tablet Take 1.5 Tabs by mouth four (4) times daily as needed for Pain for up to 30 days. Indications: PAIN    SUMAtriptan (IMITREX) 50 mg tablet Take 1 Tab by mouth once as needed for Migraine for up to 1 dose.  HYDROmorphone (DILAUDID) 8 mg tablet Take 1.5 tablets by mouth four (4) times daily as needed for Pain for up to 30 days. Indications: PAIN    HYDROmorphone (DILAUDID) 8 mg tablet Take 1.5 tablets by mouth four (4) times daily as needed for Pain for up to 30 days. Indications: PAIN    oxyCODONE CR (OXYCONTIN) 60 mg CR tablet Take 1 tablet by mouth every six (6) hours for 30 days. Indications: CHRONIC PAIN WITH OPIOID TOLERANCE, SEVERE PAIN WITH OPIOID TOLERANCE    oxyCODONE CR (OXYCONTIN) 60 mg CR tablet Take 1 tablet by mouth every six (6) hours for 30 days.  For chronic pain  Indications: CHRONIC PAIN WITH OPIOID TOLERANCE, SEVERE PAIN WITH OPIOID TOLERANCE    oxyCODONE CR (OXYCONTIN) 60 mg CR tablet Take 1 tablet by mouth every six (6) hours for 30 days. Indications: CHRONIC PAIN WITH OPIOID TOLERANCE, SEVERE PAIN WITH OPIOID TOLERANCE    oxyCODONE CR (OXYCONTIN) 40 mg CR tablet Take 1 Tab by mouth every six (6) hours for 30 days. For chronic pain  Fill on or after:  9/1/14  Indications: CHRONIC PAIN, SEVERE PAIN    oxyCODONE CR (OXYCONTIN) 40 mg CR tablet Take 1 Tab by mouth every six (6) hours for 30 days. For chronic pain  Fill on or after:  8/2/14  Indications: CHRONIC PAIN, SEVERE PAIN    oxyCODONE CR (OXYCONTIN) 40 mg CR tablet Take 1 Tab by mouth every six (6) hours for 30 days. For chronic pain  Fill on or after:  7/5/14  Indications: CHRONIC PAIN, SEVERE PAIN    oxyCODONE CR (OXYCONTIN) 40 mg CR tablet Take 1 Tab by mouth every six (6) hours for 30 days. For chronic pain  Fill on or after:  6/7/14  Indications: NEUROPATHIC PAIN, PAIN    oxyCODONE CR (OXYCONTIN) 40 mg CR tablet Take 1 Tab by mouth every six (6) hours for 30 days. For chronic pain  Fill on or after:  5/9/14  Indications: NEUROPATHIC PAIN, PAIN    oxyCODONE CR (OXYCONTIN) 40 mg CR tablet Take 1 Tab by mouth every six (6) hours for 30 days. For chronic pain  Fill on or after:  4/11/14  Indications: NEUROPATHIC PAIN, PAIN    amitriptyline (ELAVIL) 150 mg tablet Take 150 mg by mouth nightly.  HYDROmorphone (DILAUDID) 8 mg tablet Take 1.5 Tabs by mouth four (4) times daily as needed for Pain for 30 days. FILL ON OR AFTER:  10/18/13  Indications: PAIN    ergocalciferol (ERGOCALCIFEROL) 50,000 unit capsule Take 1 Cap by mouth two (2) times a week for 30 days. Indications: VITAMIN D DEFICIENCY    TRAZODONE HCL (TRAZODONE PO) Take 30 mg by mouth nightly as needed.  buPROPion SR (WELLBUTRIN SR) 200 mg SR tablet Take 400 mg by mouth daily.  clonazepam (KLONOPIN) 1 mg tablet Take 1 mg by mouth four (4) times daily.     lamotrigine (LAMICTAL) 200 mg tablet Take 200 mg by mouth daily. No current facility-administered medications for this visit. Health Maintenance   Topic Date Due    Pneumococcal 19-64 Medium Risk (1 of 1 - PPSV23) 09/02/1993    Influenza Age 5 to Adult  08/01/2018    MEDICARE YEARLY EXAM  06/07/2019    PAP AKA CERVICAL CYTOLOGY  02/09/2021    DTaP/Tdap/Td series (2 - Td) 07/01/2024     Immunization History   Administered Date(s) Administered    Tdap 07/01/2014     No LMP recorded. Patient is not currently having periods (Reason: Polycystic Ovarian Syndrome). Allergies and Intolerances: Allergies   Allergen Reactions    Methadone Other (comments)     Hullucinations,spacy feeling      Morphine Other (comments)     Mood swings,suicidal thoughts    Pcn [Penicillins] Rash    Tramadol Rash     And suicidal thoughts       Family History:   Family History   Problem Relation Age of Onset    Diabetes Mother     Heart Disease Father     Stroke Father     Breast Cancer Paternal Grandmother        Social History:   She  reports that she has been smoking Cigarettes. She has a 6.90 pack-year smoking history. She has never used smokeless tobacco.  She  reports that she does not drink alcohol.             Review of Systems: pos for hot flashed   General: negative for - chills, fatigue, fever, weight change  Psych: negative for - anxiety, depression, irritability or mood swings  ENT: negative for - headaches, hearing change, nasal congestion, oral lesions, sneezing or sore throat  Heme/ Lymph: negative for - bleeding problems, bruising, pallor or swollen lymph nodes  Endo: negative for - hot flashes, polydipsia/polyuria or temperature intolerance  Resp: negative for - cough, shortness of breath or wheezing  CV: negative for - chest pain, edema or palpitations  GI: negative for - abdominal pain, change in bowel habits, pos for  constipation,  : negative for - dysuria, hematuria, incontinence, pelvic pain or vulvar/vaginal symptoms  MSK: negative for - joint pain, joint swelling or muscle pain  Neuro: negative for - confusion, headaches, seizures or weakness  Derm: negative for - dry skin, hair changes, rash or skin lesion changes          Physical:   Vitals:   Vitals:    06/06/18 1148   BP: 155/90   Pulse: (!) 102   Resp: 16   Temp: 98.2 °F (36.8 °C)   TempSrc: Oral   SpO2: 98%   Weight: 228 lb (103.4 kg)   Height: 5' 8\" (1.727 m)           Exam:   HEENT- atraumatic,normocephalic, awake, oriented, well nourished  Neck - supple,no enlarged lymph nodes, no JVD, no thyromegaly  Chest- CTA, no rhonchi, no crackles  Heart- rrr, no murmurs / gallop/rub  Abdomen- soft,BS+,NT, no hepatosplenomegaly  Ext - no c/c/edema   Neuro- no focal deficits. Power 5/5 all extremities  Skin - warm,dry, no obvious rashes. Review of Data:   LABS:   Lab Results   Component Value Date/Time    WBC 12.7 08/23/2017 03:48 PM    HGB 14.1 08/23/2017 03:48 PM    HCT 43.3 08/23/2017 03:48 PM    PLATELET 519 59/16/0196 03:48 PM     Lab Results   Component Value Date/Time    Sodium 136 06/06/2018 01:17 PM    Potassium 4.1 06/06/2018 01:17 PM    Chloride 100 06/06/2018 01:17 PM    CO2 30 06/06/2018 01:17 PM    Glucose 89 06/06/2018 01:17 PM    BUN 15 06/06/2018 01:17 PM    Creatinine 1.00 06/06/2018 01:17 PM     Lab Results   Component Value Date/Time    Cholesterol, total 191 06/06/2018 01:17 PM    HDL Cholesterol 48 06/06/2018 01:17 PM    LDL, calculated 114.2 (H) 06/06/2018 01:17 PM    Triglyceride 144 06/06/2018 01:17 PM     No results found for: GPT        Impression / Plan:        ICD-10-CM ICD-9-CM    1. Dry mouth R68.2 527.7    2. Constipation, unspecified constipation type K59.00 564.00 polyethylene glycol (MIRALAX) 17 gram/dose powder   3. Rosamaria-menopausal N95.1 627.2 [facility] AND    4. Hypercholesteremia E78.00 272.0 LIPID PANEL      METABOLIC PANEL, COMPREHENSIVE   5. Essential hypertension I10 401.9    6.  Hypothyroidism due to acquired atrophy of thyroid E03.4 244.8 TSH 3RD GENERATION     246.8    7. IFG (impaired fasting glucose) R73.01 790.21 HEMOGLOBIN A1C WITH EAG   8. Plantar fasciitis, right M72.2 728.71 REFERRAL TO PODIATRY   9. Photosensitivity L56.8 692.72 JOSE LUIS QL, W/REFLEX CASCADE   10. Amenorrhea N91.2 626.0 PROLACTIN         Explained to patient risk benefits of the medications. Advised patient to stop meds if having any side effects. Pt verbalized understanding of the instructions. I have discussed the diagnosis with the patient and the intended plan as seen in the above orders. The patient has received an after-visit summary and questions were answered concerning future plans. I have discussed medication side effects and warnings with the patient as well. I have reviewed the plan of care with the patient, accepted their input and they are in agreement with the treatment goals. Reviewed plan of care. Patient has provided input and agrees with goals. Follow-up Disposition:  Return in about 4 months (around 10/6/2018), or if symptoms worsen or fail to improve.     Pierre Bridges MD

## 2018-06-06 NOTE — MR AVS SNAPSHOT
[address] [place] [id number] 
[phone] Patient: Estella Quintanilla MRN: [id number] SNM:[birth year] Visit Information Date & Time Provider Department Dept. Phone Encounter #  
 6/6/2018 11:30 AM [id number] EREN Churchill, [address] [place] [address] [phone] [id number] Follow-up Instructions Return in about 4 months (around 10/6/2018), or if symptoms worsen or fail to improve. Upcoming Health Maintenance Date Due Pneumococcal 19-64 Medium Risk (1 of 1 - PPSV23) 9/2/1993 MEDICARE YEARLY EXAM 3/14/2018 Influenza Age 5 to Adult 8/1/2018 PAP AKA CERVICAL CYTOLOGY 2/9/2021 DTaP/Tdap/Td series (2 - Td) 7/1/2024 Allergies as of 6/6/2018  Review Complete On: 6/6/2018 By: Fredi Ruth LPN Severity Noted Reaction Type Reactions Methadone  11/30/2012    Other (comments) Hullucinations,spacy feeling Morphine  11/21/2011    Other (comments) Mood swings,suicidal thoughts Pcn [Penicillins]    Rash Tramadol  11/21/2011    Rash And suicidal thoughts Current Immunizations  Reviewed on 7/1/2014 Name Date Tdap 7/1/2014  2:22 PM  
  
 Not reviewed this visit You Were Diagnosed With   
  
 Codes Comments Dry mouth    -  Primary ICD-10-CM: R68.2 ICD-9-CM: 527.7 Constipation, unspecified constipation type     ICD-10-CM: K59.00 ICD-9-CM: 564.00 Rosamaria-menopausal     ICD-10-CM: N95.1 ICD-9-CM: 627.2 Hypercholesteremia     ICD-10-CM: E78.00 ICD-9-CM: 272.0 Essential hypertension     ICD-10-CM: I10 
ICD-9-CM: 401.9 Hypothyroidism due to acquired atrophy of thyroid     ICD-10-CM: E03.4 ICD-9-CM: 244.8, 246.8 IFG (impaired fasting glucose)     ICD-10-CM: R73.01 
ICD-9-CM: 790.21 Plantar fasciitis, right     ICD-10-CM: M72.2 ICD-9-CM: 728.71 Photosensitivity     ICD-10-CM: L56.8 ICD-9-CM: 692.72 Amenorrhea     ICD-10-CM: N91.2 ICD-9-CM: 626.0 Vitals BP Pulse Temp Resp Height(growth percentile) Weight(growth percentile) 155/90 (!) 102 98.2 °F (36.8 °C) (Oral) 16 5' 8\" (1.727 m) 228 lb (103.4 kg) SpO2 BMI OB Status Smoking Status 98% 34.67 kg/m2 Polycystic Ovarian Syndrome Current Every Day Smoker Vitals History BMI and BSA Data Body Mass Index Body Surface Area  
 34.67 kg/m 2 2.23 m 2 Preferred Pharmacy Pharm[facility] Name Phone [id number] [address] [phone] Your Updated Medication List  
  
   
This list is accurate as of 6/6/18 12:40 PM.  Always use your most recent med list.  
  
  
  
  
 amitriptyline 150 mg tablet Commonly known as:  ELAVIL Take 150 mg by mouth nightly. clobetasol 0.05 % ointment Commonly known as:  Regenia Radha Apply  to affected area two (2) times a day. doxycycline 100 mg capsule Commonly known as:  Kathy Vernon Take 1 Cap by mouth two (2) times a day. ergocalciferol 50,000 unit capsule Commonly known as:  ERGOCALCIFEROL Take 1 Cap by mouth two (2) times a week for 30 days. Indications: VITAMIN D DEFICIENCY  
  
 fluticasone 50 mcg/actuation nasal spray Commonly known as:  Bernardo Carla 2 Sprays by Both Nostrils route daily. * hydroCHLOROthiazide 25 mg tablet Commonly known as:  HYDRODIURIL Take 1 Tab by mouth daily for 30 days. Indications: HYPERTENSION  
  
 * hydroCHLOROthiazide 25 mg tablet Commonly known as:  HYDRODIURIL Take 1 Tab by mouth daily. * HYDROmorphone 8 mg tablet Commonly known as:  DILAUDID Take 8 mg by mouth every four (4) hours as needed for Pain. * HYDROmorphone 8 mg tablet Commonly known as:  DILAUDID Take 1.5 Tabs by mouth four (4) times daily as needed for Pain for 30 days. FILL ON OR AFTER: 10/18/13  Indications: PAIN  
  
 * HYDROmorphone 8 mg tablet Commonly known as:  DILAUDID  
 Take 1.5 tablets by mouth four (4) times daily as needed for Pain for up to 30 days. Indications: PAIN  
  
 * HYDROmorphone 8 mg tablet Commonly known as:  DILAUDID Take 1.5 tablets by mouth four (4) times daily as needed for Pain for up to 30 days. Indications: PAIN  
  
 * HYDROmorphone 8 mg tablet Commonly known as:  DILAUDID Take 1.5 Tabs by mouth four (4) times daily as needed for Pain for up to 30 days. Indications: PAIN  
  
 * HYDROmorphone 8 mg tablet Commonly known as:  DILAUDID Take 1.5 Tabs by mouth four (4) times daily as needed for Pain for up to 30 days. Indications: PAIN KlonoPIN 1 mg tablet Generic drug:  clonazePAM  
Take 1 mg by mouth four (4) times daily. LaMICtal 200 mg tablet Generic drug:  lamoTRIgine Take 200 mg by mouth daily. levothyroxine 50 mcg tablet Commonly known as:  SYNTHROID Take 1 Tab by mouth Daily (before breakfast). LINZESS 145 mcg Cap capsule Generic drug:  linaclotide Take  by mouth Daily (before breakfast). lorcaserin 10 mg Tab Commonly known as:  Christell Lutz Take 10 mg by mouth two (2) times a day. Max Daily Amount: 20 mg.  
  
 losartan 50 mg tablet Commonly known as:  COZAAR  
take 1 tablet by mouth twice a day  
  
 mefenamic acid 250 mg capsule Commonly known as:  PONSTEL Take 1 Cap by mouth every six (6) hours. metFORMIN 500 mg tablet Commonly known as:  GLUCOPHAGE Take 1 Tab by mouth two (2) times daily (with meals). Omeprazole delayed release 20 mg tablet Commonly known as:  PRILOSEC D/R Take 1 Tab by mouth two (2) times a day. * oxyCODONE CR 40 mg CR tablet Commonly known as:  OxyCONTIN Take 1 Tab by mouth every six (6) hours for 30 days. For chronic pain Fill on or after: 4/11/14  Indications: NEUROPATHIC PAIN, PAIN  
  
 * oxyCODONE CR 40 mg CR tablet Commonly known as:  OxyCONTIN Take 1 Tab by mouth every six (6) hours for 30 days.  For chronic pain Fill on or after: 5/9/14  Indications: NEUROPATHIC PAIN, PAIN  
  
 * oxyCODONE CR 40 mg CR tablet Commonly known as:  OxyCONTIN Take 1 Tab by mouth every six (6) hours for 30 days. For chronic pain Fill on or after: 6/7/14  Indications: NEUROPATHIC PAIN, PAIN  
  
 * oxyCODONE CR 40 mg CR tablet Commonly known as:  OxyCONTIN Take 1 Tab by mouth every six (6) hours for 30 days. For chronic pain Fill on or after: 7/5/14  Indications: CHRONIC PAIN, SEVERE PAIN  
  
 * oxyCODONE CR 40 mg CR tablet Commonly known as:  OxyCONTIN Take 1 Tab by mouth every six (6) hours for 30 days. For chronic pain Fill on or after: 8/2/14  Indications: CHRONIC PAIN, SEVERE PAIN  
  
 * oxyCODONE CR 40 mg CR tablet Commonly known as:  OxyCONTIN Take 1 Tab by mouth every six (6) hours for 30 days. For chronic pain Fill on or after: 9/1/14  Indications: CHRONIC PAIN, SEVERE PAIN  
  
 * oxyCODONE CR 60 mg CR tablet Commonly known as:  OxyCONTIN Take 1 tablet by mouth every six (6) hours for 30 days. Indications: CHRONIC PAIN WITH OPIOID TOLERANCE, SEVERE PAIN WITH OPIOID TOLERANCE  
  
 * oxyCODONE CR 60 mg CR tablet Commonly known as:  OxyCONTIN Take 1 tablet by mouth every six (6) hours for 30 days. For chronic pain  Indications: CHRONIC PAIN WITH OPIOID TOLERANCE, SEVERE PAIN WITH OPIOID TOLERANCE  
  
 * oxyCODONE CR 60 mg CR tablet Commonly known as:  OxyCONTIN Take 1 tablet by mouth every six (6) hours for 30 days. Indications: CHRONIC PAIN WITH OPIOID TOLERANCE, SEVERE PAIN WITH OPIOID TOLERANCE  
  
 * oxyCODONE IR 30 mg immediate release tablet Commonly known as:  Nicko Dede Take 1 Tab by mouth four (4) times daily as needed for Pain. Max Daily Amount: 120 mg.  
  
 * oxyCODONE CR 60 mg CR tablet Commonly known as:  OxyCONTIN Take 1 Tab by mouth every six (6) hours for 30 days.  Indications: CHRONIC PAIN WITH OPIOID TOLERANCE, SEVERE PAIN WITH OPIOID TOLERANCE  
  
 * oxyCODONE IR 15 mg immediate release tablet Commonly known as:  OXY-IR  
  
 * OxyCONTIN 20 mg ER tablet Generic drug:  oxyCODONE ER  
  
 * polyethylene glycol 17 gram/dose powder Commonly known as:  Mariia Hodgkin Take 17 g by mouth daily. * polyethylene glycol 17 gram/dose powder Commonly known as:  Mariia Hodgkin Take 17 g by mouth daily. potassium chloride 20 mEq tablet Commonly known as:  K-DUR, KLOR-CON Take 1 Tab by mouth daily. predniSONE 10 mg dose pack Commonly known as:  STERAPRED DS See administration instruction per 10mg dose pack  
  
 simvastatin 40 mg tablet Commonly known as:  ZOCOR Take 1 Tab by mouth nightly. SUMAtriptan 50 mg tablet Commonly known as:  IMITREX Take 1 Tab by mouth once as needed for Migraine for up to 1 dose. TRAZODONE PO Take 30 mg by mouth nightly as needed. WELLBUTRIN  mg SR tablet Generic drug:  buPROPion SR Take 400 mg by mouth daily. * Notice: This list has 23 medication(s) that are the same as other medications prescribed for you. Read the directions carefully, and ask your doctor or other care provider to review them with you. Prescriptions Sent to Pharmacy Refills  
 polyethylene glycol (MIRALAX) 17 gram/dose powder 3 Sig: Take 17 g by mouth daily. Class: Normal  
 Pharmacy: [facility] [address]  #: [phone] Route: Oral  
  
We Performed the Following REFERRAL TO PODIATRY [REF90 Custom] Follow-up Instructions Return in about 4 months (around 10/6/2018), or if symptoms worsen or fail to improve. To-Do List   
 06/06/2018 Lab:  [facility] QL, W/REFLEX CASCADE   
  
 06/06/2018 Lab:  [facility] AND [address]   
  
 06/06/2018 Lab:  HEMOGLOBIN A1C WITH EAG   
  
 06/06/2018 Lab:  LIPID PANEL   
  
 06/06/2018 Lab:  METABOLIC PANEL, COMPREHENSIVE   
  
 06/06/2018   Lab:  PROLACTIN   
  
 06/06/2018 Lab:  [facility] 3RD GENERATION Referral Information Referral ID Referred By Referred To  
  
 [id number] Ronen DHILLON Not Available Visits Status Start Date End Date 1 New Request 6/6/18 6/6/19 If your referral has a status of pending review or denied, additional information will be sent to support the outcome of this decision. Patient Instructions Medicare Wellness Visit, Female The best way to live healthy is to have a lifestyle where you eat a well-balanced diet, exercise regularly, limit alcohol use, and quit all forms of tobacco/nicotine, if applicable. Regular preventive services are another way to keep healthy. Preventive services (vaccines, screening tests, monitoring & exams) can help personalize your care plan, which helps you manage your own care. Screening tests can find health problems at the earliest stages, when they are easiest to treat. [facility] follows the current, evidence-based guidelines published by the [facility] Chad Sarabia (USPSTF) when recommending preventive services for our patients. Because we follow these guidelines, sometimes recommendations change over time as research supports it. (For example, mammograms used to be recommended annually. Even though Medicare will still pay for an annual mammogram, the newer guidelines recommend a mammogram every two years for women of average risk.) Of course, you and your provider may decide to screen more often for some diseases, based on your risk and co-morbidities (chronic disease you are already diagnosed with). Preventive services for you include: - Medicare offers their members a free annual wellness visit, which is time for you and your primary care provider to discuss and plan for your preventive service needs. Take advantage of this benefit every year! 
 
-All people over age 72 should receive the recommended pneumonia vaccines. Current USPSTF guidelines recommend a series of two vaccines for the best pneumonia protection.  
 
-All adults should have a yearly flu vaccine and a tetanus vaccine every 10 years. All adults age 61 years should receive a shingles vaccine once in their lifetime.   
 
-A bone mass density test is recommended when a woman turns 65 to screen for osteoporosis. This test is only recommended once as a screening. Some providers will use this same test as a disease monitoring tool if you already have osteoporosis. -All adults age 38-68 years who are overweight should have a diabetes screening test once every three years.  
 
-Other screening tests & preventive services for persons with diabetes include: an eye exam to screen for diabetic retinopathy, a kidney function test, a foot exam, and stricter control over your cholesterol.  
 
-Cardiovascular screening for adults with routine risk involves an electrocardiogram (ECG) at intervals determined by the provider.  
 
-Colorectal cancer screenings should be done for adults age 54-65 years with normal risk. There are a number of acceptable methods of screening for this type of cancer. Each test has its own benefits and drawbacks. Discuss with your provider what is most appropriate for you during your annual wellness visit. The different tests include: colonoscopy (considered the best screening method), a fecal occult blood test, a fecal DNA test, and sigmoidoscopy. -Breast cancer screenings are recommended every other year for women of normal risk age 54-69 years.  
 
-Cervical cancer screenings for women over age 72 are only recommended with certain risk factors.  
 
-All adults born between [facility] should be screened once for Hepatitis C. Here is a list of your current Health Maintenance items (your personalized list of preventive services) with a due date: 
Health Maintenance Due Topic Date Due  Pneumococcal Vaccine (1 of 1 - PPSV23) 09/02/1993 24 [facility] Annual Well Visit  03/14/2018 Introducing [facility]! [facility] introduces [employer] patient portal. Now you can access parts of your medical record, email your doctor's office, and request medication refills online. 1. In your internet browser, go to [email or web address] 2. Click on the First Time User? Click Here link in the Sign In box. You will see the New Member Sign Up page. 3. Enter your [employer] Access Code exactly as it appears below. You will not need to use this code after youve completed the sign-up process. If you do not sign up before the expiration date, you must request a new code. · [employer] Access Code: [email or web address] Expires: 9/4/2018 11:31 AM 
 
4. Enter the last four digits of your Social Security Number (xxxx) and Date of Birth (mm/dd/yyyy) as indicated and click Submit. You will be taken to the next sign-up page. 5. Create a [employer] ID. This will be your [employer] login ID and cannot be changed, so think of one that is secure and easy to remember. 6. Create a [employer] password. You can change your password at any time. 7. Enter your Password Reset Question and Answer. This can be used at a later time if you forget your password. 8. Enter your e-mail address. You will receive e-mail notification when new information is available in [address] 9. Click Sign Up. You can now view and download portions of your medical record. 10. Click the Download Summary menu link to download a portable copy of your medical information. If you have questions, please visit the Frequently Asked Questions section of the [employer] website. Remember, [employer] is NOT to be used for urgent needs. For medical emergencies, dial 911. Now available from your iPhone and Android! Please provide this summary of care documentation to your next provider. Your primary care clinician is listed as Bradley Langley.  If you have any questions after today's visit, please call [phone].

## 2018-06-07 LAB
ANA SER QL: NEGATIVE
FSH SERPL-ACNC: 3.5 MIU/ML
LH SERPL-ACNC: 5.6 MIU/ML
PROLACTIN SERPL-MCNC: 26.1 NG/ML
SEE BELOW:, 164879: NORMAL

## 2018-06-18 DIAGNOSIS — I10 ESSENTIAL HYPERTENSION WITH GOAL BLOOD PRESSURE LESS THAN 140/90: ICD-10-CM

## 2018-06-18 RX ORDER — LOSARTAN POTASSIUM 50 MG/1
TABLET ORAL
Qty: 60 TAB | Refills: 0 | Status: SHIPPED | OUTPATIENT
Start: 2018-06-18 | End: 2018-07-23 | Stop reason: SDUPTHER

## 2018-08-19 DIAGNOSIS — E03.4 HYPOTHYROIDISM DUE TO ACQUIRED ATROPHY OF THYROID: ICD-10-CM

## 2018-08-20 RX ORDER — LEVOTHYROXINE SODIUM 50 UG/1
TABLET ORAL
Qty: 30 TAB | Refills: 1 | Status: SHIPPED | OUTPATIENT
Start: 2018-08-20 | End: 2018-10-19 | Stop reason: SDUPTHER

## 2018-08-25 DIAGNOSIS — I10 ESSENTIAL HYPERTENSION WITH GOAL BLOOD PRESSURE LESS THAN 140/90: ICD-10-CM

## 2018-08-27 RX ORDER — LOSARTAN POTASSIUM 50 MG/1
TABLET ORAL
Qty: 60 TAB | Refills: 0 | OUTPATIENT
Start: 2018-08-27

## 2018-09-04 DIAGNOSIS — I10 ESSENTIAL HYPERTENSION WITH GOAL BLOOD PRESSURE LESS THAN 140/90: Primary | ICD-10-CM

## 2018-09-04 RX ORDER — LOSARTAN POTASSIUM 50 MG/1
50 TABLET ORAL 2 TIMES DAILY
Qty: 60 TAB | Refills: 3 | Status: SHIPPED | OUTPATIENT
Start: 2018-09-04 | End: 2019-01-16 | Stop reason: SDUPTHER

## 2018-10-10 ENCOUNTER — DOCUMENTATION ONLY (OUTPATIENT)
Dept: FAMILY MEDICINE CLINIC | Age: 43
End: 2018-10-10

## 2018-10-10 NOTE — LETTER
10/10/2018 Den Johnston [address] [facility] [id number] Dear Ms. Den Johnston, We had an appointment reserved for you on 10/8/18 and were concerned when you did not show or call within 24 hours to cancel the appointment. Our policy is to call patients two days prior to their appointment to remind them of the date and time. We perform these calls as a courtesy to our patients and to allow us the opportunity to rebook the time slot should the appointment not be necessary. Recognizing that everyones time is valuable and that appointment time is limited, we ask that you provide 24 hours notice if you are unable to keep your appointment. Please call us at your earliest convenience to reschedule your appointment as your provider felt it was important to see you. Thank you for your anticipated cooperation. [address] [place] [phone]

## 2018-10-19 DIAGNOSIS — E03.4 HYPOTHYROIDISM DUE TO ACQUIRED ATROPHY OF THYROID: ICD-10-CM

## 2018-10-19 DIAGNOSIS — E78.00 HYPERCHOLESTEREMIA: ICD-10-CM

## 2018-10-20 RX ORDER — LEVOTHYROXINE SODIUM 50 UG/1
TABLET ORAL
Qty: 30 TAB | Refills: 1 | Status: SHIPPED | OUTPATIENT
Start: 2018-10-20 | End: 2018-12-16 | Stop reason: SDUPTHER

## 2018-10-20 RX ORDER — SIMVASTATIN 40 MG/1
TABLET, FILM COATED ORAL
Qty: 30 TAB | Refills: 1 | Status: SHIPPED | OUTPATIENT
Start: 2018-10-20 | End: 2018-12-16 | Stop reason: SDUPTHER

## 2018-12-16 DIAGNOSIS — E03.4 HYPOTHYROIDISM DUE TO ACQUIRED ATROPHY OF THYROID: ICD-10-CM

## 2018-12-16 DIAGNOSIS — E78.00 HYPERCHOLESTEREMIA: ICD-10-CM

## 2018-12-16 RX ORDER — OMEPRAZOLE 20 MG/1
CAPSULE, DELAYED RELEASE ORAL
Qty: 60 CAP | Refills: 3 | Status: SHIPPED | OUTPATIENT
Start: 2018-12-16 | End: 2019-03-08

## 2018-12-16 RX ORDER — HYDROCHLOROTHIAZIDE 25 MG/1
TABLET ORAL
Qty: 30 TAB | Refills: 3 | Status: SHIPPED | OUTPATIENT
Start: 2018-12-16 | End: 2019-03-08 | Stop reason: SDUPTHER

## 2018-12-16 RX ORDER — SIMVASTATIN 40 MG/1
TABLET, FILM COATED ORAL
Qty: 30 TAB | Refills: 1 | Status: SHIPPED | OUTPATIENT
Start: 2018-12-16 | End: 2019-02-12 | Stop reason: SDUPTHER

## 2018-12-16 RX ORDER — LEVOTHYROXINE SODIUM 50 UG/1
TABLET ORAL
Qty: 30 TAB | Refills: 1 | Status: SHIPPED | OUTPATIENT
Start: 2018-12-16 | End: 2019-02-12 | Stop reason: SDUPTHER

## 2019-01-16 DIAGNOSIS — I10 ESSENTIAL HYPERTENSION WITH GOAL BLOOD PRESSURE LESS THAN 140/90: ICD-10-CM

## 2019-01-16 RX ORDER — LOSARTAN POTASSIUM 50 MG/1
TABLET ORAL
Qty: 60 TAB | Refills: 3 | Status: SHIPPED | OUTPATIENT
Start: 2019-01-16 | End: 2019-03-08 | Stop reason: SDUPTHER

## 2019-02-12 DIAGNOSIS — E03.4 HYPOTHYROIDISM DUE TO ACQUIRED ATROPHY OF THYROID: ICD-10-CM

## 2019-02-12 DIAGNOSIS — E78.00 HYPERCHOLESTEREMIA: ICD-10-CM

## 2019-02-12 DIAGNOSIS — E66.01 MORBID OBESITY DUE TO EXCESS CALORIES (HCC): ICD-10-CM

## 2019-02-12 RX ORDER — METFORMIN HYDROCHLORIDE 500 MG/1
TABLET ORAL
Qty: 60 TAB | Refills: 3 | Status: SHIPPED | OUTPATIENT
Start: 2019-02-12

## 2019-02-12 RX ORDER — LEVOTHYROXINE SODIUM 50 UG/1
TABLET ORAL
Qty: 30 TAB | Refills: 1 | Status: SHIPPED | OUTPATIENT
Start: 2019-02-12 | End: 2019-03-08 | Stop reason: SDUPTHER

## 2019-02-12 RX ORDER — SIMVASTATIN 40 MG/1
TABLET, FILM COATED ORAL
Qty: 30 TAB | Refills: 1 | Status: SHIPPED | OUTPATIENT
Start: 2019-02-12 | End: 2019-03-08 | Stop reason: SDUPTHER

## 2019-03-08 ENCOUNTER — OFFICE VISIT (OUTPATIENT)
Dept: FAMILY MEDICINE CLINIC | Age: 44
End: 2019-03-08

## 2019-03-08 VITALS
HEART RATE: 117 BPM | DIASTOLIC BLOOD PRESSURE: 90 MMHG | SYSTOLIC BLOOD PRESSURE: 144 MMHG | WEIGHT: 218.4 LBS | HEIGHT: 68 IN | RESPIRATION RATE: 20 BRPM | OXYGEN SATURATION: 92 % | TEMPERATURE: 99.3 F | BODY MASS INDEX: 33.1 KG/M2

## 2019-03-08 DIAGNOSIS — E03.4 HYPOTHYROIDISM DUE TO ACQUIRED ATROPHY OF THYROID: ICD-10-CM

## 2019-03-08 DIAGNOSIS — E28.2 PCOS (POLYCYSTIC OVARIAN SYNDROME): ICD-10-CM

## 2019-03-08 DIAGNOSIS — E66.01 MORBID OBESITY DUE TO EXCESS CALORIES (HCC): ICD-10-CM

## 2019-03-08 DIAGNOSIS — K59.03 DRUG-INDUCED CONSTIPATION: ICD-10-CM

## 2019-03-08 DIAGNOSIS — M25.572 CHRONIC PAIN OF LEFT ANKLE: ICD-10-CM

## 2019-03-08 DIAGNOSIS — I10 ESSENTIAL HYPERTENSION: Primary | ICD-10-CM

## 2019-03-08 DIAGNOSIS — R73.01 IMPAIRED FASTING GLUCOSE: ICD-10-CM

## 2019-03-08 DIAGNOSIS — G89.29 CHRONIC PAIN OF LEFT ANKLE: ICD-10-CM

## 2019-03-08 DIAGNOSIS — E78.5 DYSLIPIDEMIA: ICD-10-CM

## 2019-03-08 RX ORDER — SIMVASTATIN 40 MG/1
40 TABLET, FILM COATED ORAL
Qty: 90 TAB | Refills: 2 | Status: SHIPPED | OUTPATIENT
Start: 2019-03-08 | End: 2019-12-08 | Stop reason: SDUPTHER

## 2019-03-08 RX ORDER — HYDROCHLOROTHIAZIDE 25 MG/1
25 TABLET ORAL DAILY
Qty: 90 TAB | Refills: 2 | Status: SHIPPED | OUTPATIENT
Start: 2019-03-08 | End: 2019-12-08 | Stop reason: SDUPTHER

## 2019-03-08 RX ORDER — AMITRIPTYLINE HYDROCHLORIDE 150 MG/1
TABLET, FILM COATED ORAL
Refills: 0 | COMMUNITY
Start: 2019-02-06

## 2019-03-08 RX ORDER — POLYETHYLENE GLYCOL 3350 17 G/17G
17 POWDER, FOR SOLUTION ORAL DAILY
Qty: 14 EACH | Refills: 5 | Status: SHIPPED | OUTPATIENT
Start: 2019-03-08

## 2019-03-08 RX ORDER — LEVOTHYROXINE SODIUM 50 UG/1
50 TABLET ORAL
Qty: 90 TAB | Refills: 2 | Status: SHIPPED | OUTPATIENT
Start: 2019-03-08 | End: 2019-12-08 | Stop reason: SDUPTHER

## 2019-03-08 RX ORDER — BUPROPION HYDROCHLORIDE 150 MG/1
150 TABLET ORAL
COMMUNITY

## 2019-03-08 RX ORDER — LOSARTAN POTASSIUM 100 MG/1
100 TABLET ORAL DAILY
Qty: 90 TAB | Refills: 2 | Status: SHIPPED | OUTPATIENT
Start: 2019-03-08 | End: 2019-12-08 | Stop reason: SDUPTHER

## 2019-03-08 RX ORDER — DEXTROAMPHETAMINE SACCHARATE, AMPHETAMINE ASPARTATE, DEXTROAMPHETAMINE SULFATE AND AMPHETAMINE SULFATE 5; 5; 5; 5 MG/1; MG/1; MG/1; MG/1
20 TABLET ORAL 2 TIMES DAILY
COMMUNITY

## 2019-03-08 NOTE — PROGRESS NOTES
History of Present Illness  Deena Duke is a 39 y.o. female who presents today for management of    Chief Complaint   Patient presents with    Diabetes    Hypertension    Cholesterol Problem    Hyperthyroidism       Patient is here to establish care. Previous PCP: Dr. Hanane Oviedo    Cardiovascular Review:  The patient has hypertension and hyperlipidemia. Diet and Lifestyle: generally follows a low fat low cholesterol diet, generally follows a low sodium diet, sedentary, nonsmoker  Home BP Monitoring: is not measured at home. Pertinent ROS: taking medications as instructed, no medication side effects noted, no TIA's, no chest pain on exertion, no dyspnea on exertion, no swelling of ankles. Thyroid Review:  Patient is seen for followup of hypothyroidism. Thyroid ROS: denies fatigue, weight changes, heat/cold intolerance, bowel/skin changes or CVS symptoms. Patient has PCOS. She has not taken metformin in months. She has chronic left ankle pain after an injury. She has been diagnosed with complex regional pain syndrome in 2009. She is being followed by Dr. Johnny Hampton (pain management/enurology). Current therapy: oxycontin 40mg and hydromorphone 8mg. She has history of bipolar disorder, anxiety and depression. She is being followed by Dr. Ananth Starks. Past Medical History  Past Medical History:   Diagnosis Date    ADD (attention deficit disorder)     Chronic pain     back    Complex regional pain syndrome of left lower extremity     DDD (degenerative disc disease)     Depression     Hypercholesterolemia     Hypertension     Psychotic disorder ([facility] [address]     Reflex sympathetic dystrophy of the lower limb 7/27/2011    RSD (reflex sympathetic dystrophy)     RSD (reflex sympathetic dystrophy)     Sacralization     L5    Sprained ankle     Thyroid disease         Surgical History  History reviewed. No pertinent surgical history.      Current Medications  Current Outpatient Medications   Medication Sig  buPROPion XL (WELLBUTRIN XL) 150 mg tablet Take 150 mg by mouth every morning.  dextroamphetamine-amphetamine (ADDERALL) 20 mg tablet Take 20 mg by mouth two (2) times a day.  hydroCHLOROthiazide (HYDRODIURIL) 25 mg tablet Take 1 Tab by mouth daily.  simvastatin (ZOCOR) 40 mg tablet Take 1 Tab by mouth nightly.  levothyroxine (SYNTHROID) 50 mcg tablet Take 1 Tab by mouth Daily (before breakfast).  losartan (COZAAR) 100 mg tablet Take 1 Tab by mouth daily.  polyethylene glycol (MIRALAX) 17 gram packet Take 1 Packet by mouth daily.  Omeprazole delayed release (PRILOSEC D/R) 20 mg tablet Take 1 Tab by mouth two (2) times a day. (Patient taking differently: Take 20 mg by mouth daily.)    HYDROmorphone (DILAUDID) 8 mg tablet Take 8 mg by mouth every four (4) hours as needed for Pain.  linaclotide (LINZESS) 145 mcg cap capsule Take  by mouth nightly.  clonazepam (KLONOPIN) 1 mg tablet Take 1 mg by mouth three (3) times daily.  lamotrigine (LAMICTAL) 200 mg tablet Take 200 mg by mouth two (2) times a day.  amitriptyline (ELAVIL) 150 mg tablet     metFORMIN (GLUCOPHAGE) 500 mg tablet take 1 tablet by mouth twice a day with meals    oxyCODONE CR (OXYCONTIN) 40 mg CR tablet Take 1 Tab by mouth every six (6) hours for 30 days. For chronic pain  Fill on or after:  8/2/14  Indications: CHRONIC PAIN, SEVERE PAIN     No current facility-administered medications for this visit.         Allergies/Drug Reactions  Allergies   Allergen Reactions    Methadone Other (comments)     Hullucinations,spacy feeling      Morphine Other (comments)     Mood swings,suicidal thoughts    Pcn [Penicillins] Rash    Tramadol Rash     And suicidal thoughts        Family History  Family History   Problem Relation Age of Onset    Diabetes Mother     Heart Disease Father     Stroke Father     Breast Cancer Paternal Grandmother         Social History  Social History     Socioeconomic History    Marital status:      Spouse name: Not on file    Number of children: Not on file    Years of education: Not on file    Highest education level: Not on file   Social Needs    Financial resource strain: Not on file    Food insecurity - worry: Not on file    Food insecurity - inability: Not on file    Transportation needs - medical: Not on file    Transportation needs - non-medical: Not on file   Occupational History    Not on file   Tobacco Use    Smoking status: Current Every Day Smoker     Packs/day: 0.30     Years: 23.00     Pack years: 6.90     Types: Cigarettes    Smokeless tobacco: Never Used    Tobacco comment: \"not ready to quit yet\"   Substance and Sexual Activity    Alcohol use: No     Alcohol/week: 0.0 oz    Drug use: No    Sexual activity: Not Currently   Other Topics Concern    Not on file   Social History Narrative    Not on file       Health Maintenance   Topic Date Due    FOOT EXAM Q1  09/02/1984    MICROALBUMIN Q1  09/02/1984    Pneumococcal 19-64 Medium Risk (1 of 1 - PPSV23) 09/02/1993    EYE EXAM RETINAL OR DILATED  07/07/2016    Influenza Age 9 to Adult  08/01/2018    HEMOGLOBIN A1C Q6M  12/06/2018    LIPID PANEL Q1  06/06/2019    MEDICARE YEARLY EXAM  06/07/2019    PAP AKA CERVICAL CYTOLOGY  02/09/2021    DTaP/Tdap/Td series (3 - Td) 06/13/2028     Immunization History   Administered Date(s) Administered    Tdap 07/01/2014       Review of Systems  Negative except as mentioned in HPI    Physical Exam  Vital signs:   Vitals:    03/08/19 1436   BP: 144/90   Pulse: (!) 117   Resp: 20   Temp: 99.3 °F (37.4 °C)   TempSrc: Oral   SpO2: 92%   Weight: 218 lb 6.4 oz (99.1 kg)   Height: 5' 8\" (1.727 m)       General: alert, oriented, not in distress  Head: scalp normal, atraumatic  Eyes: pupils are equal and reactive, full and intact EOM's  Lips/Mouth: moist lips and buccal mucosa, non-enlarged tonsils, pink throat  Neck: supple, no JVD, no lymphadenopathy, non-palpable thyroid  Chest/Lungs: clear breath sounds, no wheezing or crackles  Heart: normal rate, regular rhythm, no murmur  Extremities: no focal deformities, no edema  Skin: no active skin lesions      Laboratory/Tests:    Component      Latest Ref Rng & Units 6/6/2018 6/6/2018 6/6/2018 6/6/2018           1:17 PM  1:17 PM  1:17 PM  1:17 PM   Sodium      136 - 145 mmol/L    136   Potassium      3.5 - 5.5 mmol/L    4.1   Chloride      100 - 108 mmol/L    100   CO2      21 - 32 mmol/L    30   Anion gap      3.0 - 18 mmol/L    6   Glucose      74 - 99 mg/dL    89   BUN      7.0 - 18 MG/DL    15   Creatinine      0.6 - 1.3 MG/DL    1.00   BUN/Creatinine ratio      12 - 20      15   GFR est AA      >60 ml/min/1.73m2    >60   GFR est non-AA      >60 ml/min/1.73m2    >60   Calcium      8.5 - 10.1 MG/DL    9.3   Bilirubin, total      0.2 - 1.0 MG/DL    0.1 (L)   ALT (SGPT)      13 - 56 U/L    30   AST      15 - 37 U/L    30   Alk. phosphatase      45 - 117 U/L    107   Protein, total      6.4 - 8.2 g/dL    7.6   Albumin      3.4 - 5.0 g/dL    3.7   Globulin      2.0 - 4.0 g/dL    3.9   A-G Ratio      0.8 - 1.7      0.9   Hemoglobin A1c, (calculated)      4.2 - 5.6 %   5.9 (H)    Est. average glucose      mg/dL   123    [facility] Direct      NEGATIVE    NEGATIVE     See below        Comment     Prolactin      ng/mL 26.1        Component      Latest Ref Rng & Units 6/6/2018 6/6/2018           1:17 PM  1:17 PM   Cholesterol, total      <200 MG/    Triglyceride      <150 MG/    HDL Cholesterol      40 - 60 MG/DL 48    LDL, calculated      0 - 100 MG/.2 (H)    VLDL, calculated      MG/DL 28.8    CHOL/HDL Ratio      0 - 5.0   4.0    TSH      0.36 - 3.74 uIU/mL  3.31     Assessment/Plan:      1. Essential hypertension  - fairly controlled  - continue current meds  - hydroCHLOROthiazide (HYDRODIURIL) 25 mg tablet; Take 1 Tab by mouth daily. Dispense: 90 Tab; Refill: 2  - losartan (COZAAR) 100 mg tablet;  Take 1 Tab by mouth daily.  Dispense: 90 Tab; Refill: 2    2. Dyslipidemia  - controlled  - simvastatin (ZOCOR) 40 mg tablet; Take 1 Tab by mouth nightly. Dispense: 90 Tab; Refill: 2    3. Hypothyroidism due to acquired atrophy of thyroid  - stable  - levothyroxine (SYNTHROID) 50 mcg tablet; Take 1 Tab by mouth Daily (before breakfast). Dispense: 90 Tab; Refill: 2    4. Morbid obesity due to excess calories ([address]  - Discussed the patient's BMI with her. The BMI follow up plan is as follows:   dietary management education, guidance, and counseling  encourage exercise  monitor weight  prescribed dietary intake    5. PCOS (polycystic ovarian syndrome)  - stable  - off metformin    6. Drug-induced constipation  - on opioid medications  - polyethylene glycol (MIRALAX) 17 gram packet; Take 1 Packet by mouth daily. Dispense: 14 Each; Refill: 5    7. Chronic pain of left ankle  - pain management follow-up - on oxycontin and dilaudid    8. Impaired fasting glucose  - has not taken metformin for months  - HEMOGLOBIN A1C WITH EAG; Future  - METABOLIC PANEL, BASIC; Future      Follow-up Disposition:  Return in about 3 months (around 6/8/2019) for ROV. I have discussed the diagnosis with the patient and the intended plan as seen in the above orders. The patient has received an after-visit summary and questions were answered concerning future plans. I have discussed medication side effects and warnings with the patient as well. I have reviewed the plan of care with the patient, accepted their input and they are in agreement with the treatment goals.        Dilia Paiz MD  March 8, 2019

## 2019-03-08 NOTE — PROGRESS NOTES
1. Have you been to the ER, urgent care clinic since your last visit? Hospitalized since your last visit? No    2. Have you seen or consulted any other health care providers outside of the [address] [place] [address] since your last visit? Include any pap smears or colon screening.  No

## 2019-04-11 DIAGNOSIS — K21.9 GASTROESOPHAGEAL REFLUX DISEASE WITHOUT ESOPHAGITIS: ICD-10-CM

## 2019-04-11 RX ORDER — OMEPRAZOLE 20 MG/1
CAPSULE, DELAYED RELEASE ORAL
Qty: 60 CAP | Refills: 3 | OUTPATIENT
Start: 2019-04-11

## 2019-04-11 RX ORDER — PHENOL/SODIUM PHENOLATE
20 AEROSOL, SPRAY (ML) MUCOUS MEMBRANE 2 TIMES DAILY
Qty: 180 TAB | Refills: 1 | Status: SHIPPED | OUTPATIENT
Start: 2019-04-11

## 2019-06-09 DIAGNOSIS — E66.01 MORBID OBESITY DUE TO EXCESS CALORIES (HCC): ICD-10-CM

## 2019-06-10 RX ORDER — METFORMIN HYDROCHLORIDE 500 MG/1
TABLET ORAL
Qty: 60 TAB | Refills: 3 | OUTPATIENT
Start: 2019-06-10

## 2019-06-22 DIAGNOSIS — J30.89 OTHER ALLERGIC RHINITIS: ICD-10-CM

## 2019-06-22 DIAGNOSIS — E66.01 MORBID OBESITY DUE TO EXCESS CALORIES (HCC): ICD-10-CM

## 2019-06-24 RX ORDER — FLUTICASONE PROPIONATE 50 MCG
SPRAY, SUSPENSION (ML) NASAL
Qty: 48 G | Refills: 4 | OUTPATIENT
Start: 2019-06-24

## 2019-06-24 RX ORDER — METFORMIN HYDROCHLORIDE 500 MG/1
TABLET ORAL
Qty: 180 TAB | Refills: 3 | OUTPATIENT
Start: 2019-06-24

## 2019-12-07 DIAGNOSIS — E03.4 HYPOTHYROIDISM DUE TO ACQUIRED ATROPHY OF THYROID: ICD-10-CM

## 2019-12-07 DIAGNOSIS — E78.5 DYSLIPIDEMIA: ICD-10-CM

## 2019-12-07 DIAGNOSIS — I10 ESSENTIAL HYPERTENSION: ICD-10-CM

## 2019-12-08 RX ORDER — LOSARTAN POTASSIUM 100 MG/1
TABLET ORAL
Qty: 90 TAB | Refills: 0 | Status: SHIPPED | OUTPATIENT
Start: 2019-12-08 | End: 2020-03-10

## 2019-12-08 RX ORDER — SIMVASTATIN 40 MG/1
TABLET, FILM COATED ORAL
Qty: 90 TAB | Refills: 0 | Status: SHIPPED | OUTPATIENT
Start: 2019-12-08 | End: 2020-03-10

## 2019-12-08 RX ORDER — LEVOTHYROXINE SODIUM 50 UG/1
TABLET ORAL
Qty: 90 TAB | Refills: 0 | Status: SHIPPED | OUTPATIENT
Start: 2019-12-08 | End: 2020-03-10

## 2019-12-08 RX ORDER — HYDROCHLOROTHIAZIDE 25 MG/1
TABLET ORAL
Qty: 90 TAB | Refills: 0 | Status: SHIPPED | OUTPATIENT
Start: 2019-12-08 | End: 2020-03-10

## 2020-03-02 ENCOUNTER — HOSPITAL ENCOUNTER (EMERGENCY)
Age: 45
Discharge: HOME | End: 2020-03-02
Attending: EMERGENCY MEDICINE
Payer: MEDICARE

## 2020-03-02 ENCOUNTER — APPOINTMENT (OUTPATIENT)
Dept: GENERAL RADIOLOGY | Age: 45
End: 2020-03-02
Attending: PHYSICIAN ASSISTANT
Payer: MEDICARE

## 2020-03-02 ENCOUNTER — TELEPHONE (OUTPATIENT)
Dept: FAMILY MEDICINE CLINIC | Age: 45
End: 2020-03-02

## 2020-03-02 VITALS
WEIGHT: 232 LBS | DIASTOLIC BLOOD PRESSURE: 89 MMHG | TEMPERATURE: 99.4 F | BODY MASS INDEX: 36.41 KG/M2 | SYSTOLIC BLOOD PRESSURE: 142 MMHG | RESPIRATION RATE: 16 BRPM | OXYGEN SATURATION: 97 % | HEART RATE: 103 BPM | HEIGHT: 67 IN

## 2020-03-02 DIAGNOSIS — M79.89 SWELLING OF LOWER LEG: Primary | ICD-10-CM

## 2020-03-02 LAB
ALBUMIN SERPL-MCNC: 3.5 G/DL (ref 3.4–5)
ALBUMIN/GLOB SERPL: 0.7 {RATIO} (ref 0.8–1.7)
ALP SERPL-CCNC: 117 U/L (ref 45–117)
ALT SERPL-CCNC: 50 U/L (ref 13–56)
ANION GAP SERPL CALC-SCNC: 7 MMOL/L (ref 3–18)
APPEARANCE UR: CLEAR
AST SERPL-CCNC: 39 U/L (ref 10–38)
ATRIAL RATE: 104 BPM
BASOPHILS # BLD: 0.1 K/UL (ref 0–0.1)
BASOPHILS NFR BLD: 1 % (ref 0–2)
BILIRUB SERPL-MCNC: 0.3 MG/DL (ref 0.2–1)
BILIRUB UR QL: NEGATIVE
BNP SERPL-MCNC: 81 PG/ML (ref 0–450)
BUN SERPL-MCNC: 23 MG/DL (ref 7–18)
BUN/CREAT SERPL: 20 (ref 12–20)
CALCIUM SERPL-MCNC: 8.9 MG/DL (ref 8.5–10.1)
CALCULATED P AXIS, ECG09: 30 DEGREES
CALCULATED R AXIS, ECG10: 10 DEGREES
CALCULATED T AXIS, ECG11: 34 DEGREES
CHLORIDE SERPL-SCNC: 97 MMOL/L (ref 100–111)
CK MB CFR SERPL CALC: 2.1 % (ref 0–4)
CK MB SERPL-MCNC: 3.4 NG/ML (ref 5–25)
CK SERPL-CCNC: 161 U/L (ref 26–192)
CO2 SERPL-SCNC: 32 MMOL/L (ref 21–32)
COLOR UR: YELLOW
CREAT SERPL-MCNC: 1.14 MG/DL (ref 0.6–1.3)
DIAGNOSIS, 93000: NORMAL
DIFFERENTIAL METHOD BLD: ABNORMAL
EOSINOPHIL # BLD: 0.2 K/UL (ref 0–0.4)
EOSINOPHIL NFR BLD: 2 % (ref 0–5)
ERYTHROCYTE [DISTWIDTH] IN BLOOD BY AUTOMATED COUNT: 13.7 % (ref 11.6–14.5)
GLOBULIN SER CALC-MCNC: 4.7 G/DL (ref 2–4)
GLUCOSE SERPL-MCNC: 94 MG/DL (ref 74–99)
GLUCOSE UR STRIP.AUTO-MCNC: NEGATIVE MG/DL
HCT VFR BLD AUTO: 36.8 % (ref 35–45)
HGB BLD-MCNC: 11.9 G/DL (ref 12–16)
HGB UR QL STRIP: NEGATIVE
KETONES UR QL STRIP.AUTO: NEGATIVE MG/DL
LEUKOCYTE ESTERASE UR QL STRIP.AUTO: NEGATIVE
LYMPHOCYTES # BLD: 2.4 K/UL (ref 0.9–3.6)
LYMPHOCYTES NFR BLD: 24 % (ref 21–52)
MCH RBC QN AUTO: 27.9 PG (ref 24–34)
MCHC RBC AUTO-ENTMCNC: 32.3 G/DL (ref 31–37)
MCV RBC AUTO: 86.4 FL (ref 74–97)
MONOCYTES # BLD: 1.2 K/UL (ref 0.05–1.2)
MONOCYTES NFR BLD: 12 % (ref 3–10)
NEUTS SEG # BLD: 6.2 K/UL (ref 1.8–8)
NEUTS SEG NFR BLD: 61 % (ref 40–73)
NITRITE UR QL STRIP.AUTO: NEGATIVE
P-R INTERVAL, ECG05: 158 MS
PH UR STRIP: 7.5 [PH] (ref 5–8)
PLATELET # BLD AUTO: 253 K/UL (ref 135–420)
PMV BLD AUTO: 9.6 FL (ref 9.2–11.8)
POTASSIUM SERPL-SCNC: 3.6 MMOL/L (ref 3.5–5.5)
PROT SERPL-MCNC: 8.2 G/DL (ref 6.4–8.2)
PROT UR STRIP-MCNC: NEGATIVE MG/DL
Q-T INTERVAL, ECG07: 344 MS
QRS DURATION, ECG06: 92 MS
QTC CALCULATION (BEZET), ECG08: 448 MS
RBC # BLD AUTO: 4.26 M/UL (ref 4.2–5.3)
SODIUM SERPL-SCNC: 136 MMOL/L (ref 136–145)
SP GR UR REFRACTOMETRY: 1.01 (ref 1–1.03)
TROPONIN I SERPL-MCNC: <0.02 NG/ML (ref 0–0.04)
TSH SERPL DL<=0.05 MIU/L-ACNC: 2.75 UIU/ML (ref 0.36–3.74)
UROBILINOGEN UR QL STRIP.AUTO: 0.2 EU/DL (ref 0.2–1)
VENTRICULAR RATE, ECG03: 102 BPM
WBC # BLD AUTO: 10 K/UL (ref 4.6–13.2)

## 2020-03-02 PROCEDURE — 99284 EMERGENCY DEPT VISIT MOD MDM: CPT

## 2020-03-02 PROCEDURE — 85025 COMPLETE CBC W/AUTO DIFF WBC: CPT

## 2020-03-02 PROCEDURE — 82550 ASSAY OF CK (CPK): CPT

## 2020-03-02 PROCEDURE — 81003 URINALYSIS AUTO W/O SCOPE: CPT

## 2020-03-02 PROCEDURE — 83880 ASSAY OF NATRIURETIC PEPTIDE: CPT

## 2020-03-02 PROCEDURE — 71046 X-RAY EXAM CHEST 2 VIEWS: CPT

## 2020-03-02 PROCEDURE — 84443 ASSAY THYROID STIM HORMONE: CPT

## 2020-03-02 PROCEDURE — 80053 COMPREHEN METABOLIC PANEL: CPT

## 2020-03-02 PROCEDURE — 93005 ELECTROCARDIOGRAM TRACING: CPT

## 2020-03-02 RX ORDER — FENTANYL 50 UG/1
1 PATCH TRANSDERMAL
COMMUNITY

## 2020-03-02 NOTE — TELEPHONE ENCOUNTER
Nurse spoke to patient, last few days, legs swelling from ankle to knees. Pt states it will get hot and red, tempt of 99. 2, pt states she have green mucus. Nurse advised that provider can see her 03/03/2020. Pt advised to go to ER for immediate evaluation. Pt states it is freaking her out and she didn't know what to do. Appt scheduled and pt will call back to cancel if needed. This encounter will be closed.

## 2020-03-02 NOTE — ED TRIAGE NOTES
Bilateral lower leg swelling and pitting edema x 4 days, states she has not Hx of leg swelling, states she has had a cold for the past few days that is worsening.  Patient states she has pain in both ankles and her foot is swollen enough that she cannot put her shoes on

## 2020-03-02 NOTE — TELEPHONE ENCOUNTER
Patient called in requesting an appointment because her legs are swollen up to her knees in both legs. Patient would like to be sen today if possible.  Please assist.

## 2020-03-02 NOTE — ED PROVIDER NOTES
EMERGENCY DEPARTMENT HISTORY AND PHYSICAL EXAM      Date: 3/2/2020  Patient Name: Juan Polo    History of Presenting Illness     Chief Complaint   Patient presents with    Leg Swelling    Nasal Congestion       History Provided By: Patient    HPI: Juan Polo, 38 y.o. female PMHx significant for hypertension, reflex sympathetic dystrophy, DDD, depression, ADD, hypercholesterolemia, hypothyroidism, complex regional pain syndrome of left lower extremity presents ambulatory to the ED with cc of bilateral lower leg swelling x4 days. Denies history of CHF. Denies CP, SOB, dizziness. Patient reports taking 1 of her dad's Lasix pills which caused her to urinate more frequently but did not help improve lower leg symptoms. Denies cardiac history. Denies new medications or recent medication changes. Rates pain 2/10. Patient also reports congestion with associated productive cough x1 week. Denies vomiting, diarrhea, abdominal pain. Patient has been taking Benadryl without relief of symptoms. No sore throat, ear pain. Subjective fevers. There are no other complaints, changes, or physical findings at this time. PCP: Shannon Mesa MD    No current facility-administered medications on file prior to encounter. Current Outpatient Medications on File Prior to Encounter   Medication Sig Dispense Refill    fentaNYL (DURAGESIC) 50 mcg/hr PATCH 1 Patch by TransDERmal route every seventy-two (72) hours.  hydroCHLOROthiazide (HYDRODIURIL) 25 mg tablet take 1 tablet by mouth once daily 90 Tab 0    simvastatin (ZOCOR) 40 mg tablet take 1 tablet by mouth NIGHTLY 90 Tab 0    levothyroxine (SYNTHROID) 50 mcg tablet take 1 tablet by mouth daily (BEFORE BREAKFAST) 90 Tab 0    losartan (COZAAR) 100 mg tablet take 1 tablet by mouth once daily 90 Tab 0    Omeprazole delayed release (PRILOSEC D/R) 20 mg tablet Take 1 Tab by mouth two (2) times a day.  180 Tab 1    buPROPion XL (WELLBUTRIN XL) 150 mg tablet Take 150 mg by mouth every morning.  dextroamphetamine-amphetamine (ADDERALL) 20 mg tablet Take 20 mg by mouth two (2) times a day.  amitriptyline (ELAVIL) 150 mg tablet   0    polyethylene glycol (MIRALAX) 17 gram packet Take 1 Packet by mouth daily. 14 Each 5    HYDROmorphone (DILAUDID) 8 mg tablet Take 8 mg by mouth every four (4) hours as needed for Pain.  clonazepam (KLONOPIN) 1 mg tablet Take 1 mg by mouth three (3) times daily.  lamotrigine (LAMICTAL) 200 mg tablet Take 200 mg by mouth two (2) times a day.  metFORMIN (GLUCOPHAGE) 500 mg tablet take 1 tablet by mouth twice a day with meals 60 Tab 3    linaclotide (LINZESS) 145 mcg cap capsule Take  by mouth nightly.  oxyCODONE CR (OXYCONTIN) 40 mg CR tablet Take 1 Tab by mouth every six (6) hours for 30 days. For chronic pain  Fill on or after:  14  Indications: CHRONIC PAIN, SEVERE PAIN 120 Tab 0       Past History     Past Medical History:  Past Medical History:   Diagnosis Date    ADD (attention deficit disorder)     Chronic pain     back    Complex regional pain syndrome of left lower extremity     DDD (degenerative disc disease)     Depression     Hypercholesterolemia     Hypertension     Psychotic disorder ([facility] [address]     Reflex sympathetic dystrophy of the lower limb 2011    RSD (reflex sympathetic dystrophy)     RSD (reflex sympathetic dystrophy)     Sacralization     L5    Sprained ankle     Thyroid disease        Past Surgical History:  History reviewed. No pertinent surgical history.     Family History:  Family History   Problem Relation Age of Onset    Diabetes Mother     Heart Disease Father     Stroke Father     Breast Cancer Paternal Grandmother        Social History:  Social History     Tobacco Use    Smoking status: Former Smoker     Packs/day: 0.30     Years: 23.00     Pack years: 6.90     Types: Cigarettes     Last attempt to quit: 2019     Years since quittin.6    Smokeless tobacco: Never Used    Tobacco comment: \"not ready to quit yet\"   Substance Use Topics    Alcohol use: No     Alcohol/week: 0.0 standard drinks    Drug use: No       Allergies: Allergies   Allergen Reactions    Methadone Other (comments)     Hullucinations,spacy feeling      Morphine Other (comments)     Mood swings,suicidal thoughts    Pcn [Penicillins] Rash    Tramadol Rash     And suicidal thoughts         Review of Systems   Review of Systems   Constitutional: Negative for chills and fever. HENT: Positive for congestion. Respiratory: Positive for cough. Negative for shortness of breath. Cardiovascular: Positive for leg swelling (bilateral). Negative for chest pain. Gastrointestinal: Negative for abdominal pain, nausea and vomiting. Genitourinary: Negative for flank pain. Musculoskeletal: Negative for back pain and myalgias. Skin: Negative for color change, pallor, rash and wound. Neurological: Negative for dizziness, weakness and light-headedness. All other systems reviewed and are negative. Physical Exam   Physical Exam  Vitals signs and nursing note reviewed. Constitutional:       General: She is not in acute distress. Appearance: She is well-developed. Comments: Patient well-appearing in NAD   HENT:      Head: Normocephalic and atraumatic. Eyes:      Conjunctiva/sclera: Conjunctivae normal.   Cardiovascular:      Rate and Rhythm: Normal rate and regular rhythm. Heart sounds: Normal heart sounds. Comments: No murmurs rubs or gallops  +1 pitting edema b/l  Pulmonary:      Effort: Pulmonary effort is normal. No respiratory distress. Breath sounds: Normal breath sounds. Comments: Lungs CTA  Not working to breathe  No intercostal retractions  Abdominal:      General: Bowel sounds are normal. There is no distension. Palpations: Abdomen is soft. Comments: Abdomen soft nontender   Musculoskeletal: Normal range of motion.    Skin: General: Skin is warm. Findings: No rash. Comments: No overlying erythema, warmth, or ecchymosis to lower legs b/l  No calf tenderness   Neurological:      Mental Status: She is alert and oriented to person, place, and time. Psychiatric:         Behavior: Behavior normal.         Diagnostic Study Results     Labs -   No results found for this or any previous visit (from the past 12 hour(s)). Radiologic Studies -   XR CHEST PA LAT   Final Result   IMPRESSION:      No acute radiographic cardiopulmonary abnormality. CT Results  (Last 48 hours)    None        CXR Results  (Last 48 hours)               03/02/20 1317  XR CHEST PA LAT Final result    Impression:  IMPRESSION:       No acute radiographic cardiopulmonary abnormality. Narrative:  EXAM: XR CHEST PA LAT       CLINICAL INDICATION/HISTORY: bilateral lower leg swelling, cough   -Additional: None       COMPARISON: 10/21/2009       TECHNIQUE: PA and lateral views of the chest       _______________       FINDINGS:       HEART AND MEDIASTINUM: Cardiac size and mediastinal contours are within normal   limits       LUNGS AND PLEURAL SPACES: No focal pneumonic consolidation, pneumothorax or   pleural effusion       BONY THORAX AND SOFT TISSUES: No acute osseous abnormality       _______________                 Medical Decision Making   I am the first provider for this patient. I reviewed the vital signs, available nursing notes, past medical history, past surgical history, family history and social history. Vital Signs-Reviewed the patient's vital signs. No data found. EKG interpretation: (Preliminary)  Rhythm: sinus tachycardia; and regular . Rate (approx.): 102; Axis: normal; NV interval: normal; QRS interval: normal ; ST/T wave: normal; Other findings: normal.    No acute ischemic changes. No previous EKG for comparison.     Records Reviewed: Nursing Notes and Old Medical Records    Provider Notes (Medical Decision Making): DDx: CHF exacerbation, Nephrotic syndrome, URI, PNA, Bronchitis    37 yo F with complaints of b/l lower leg swelling x 4 days. Denies hx CHF. No cardiac hx. Denies new medications. BNP WNL. CXR shows no cardiomegaly or signs of fluid overload. EKG shows no acute ischemic changes. No calf tenderness. Labs WNL. Sx possibly due to increased sodium intake. Pt non-toxic appearing in NAD. Pt stable for outpatient management. Stressed importance of prompt PCP f/u for continued management. Return to ED if sx worsen. ED Course:   Initial assessment performed. The patients presenting problems have been discussed, and they are in agreement with the care plan formulated and outlined with them. I have encouraged them to ask questions as they arise throughout their visit. Discussed lab results with patient and mom at bedside. Patient states she is very susceptible to increased sodium intake and after evaluation of recent diet patient states mom recently made soup that was high in sodium. Pt reports previous hx of lower leg swelling with increased sodium intake. Discussed need for PCP f/u for continued management and evaluation of sx. Disposition:  Discussed lab and imaging results with pt along with dx and treatment plan. Discussed importance of PCP follow up. All questions answered. Pt voiced they understood. Return if sx worsen. PLAN:  1. Discharge Medication List as of 3/2/2020  3:14 PM      CONTINUE these medications which have NOT CHANGED    Details   fentaNYL (DURAGESIC) 50 mcg/hr PATCH 1 Patch by TransDERmal route every seventy-two (72) hours. , Historical Med      hydroCHLOROthiazide (HYDRODIURIL) 25 mg tablet take 1 tablet by mouth once daily, Normal, Disp-90 Tab, R-0      simvastatin (ZOCOR) 40 mg tablet take 1 tablet by mouth NIGHTLY, Normal, Disp-90 Tab, R-0      levothyroxine (SYNTHROID) 50 mcg tablet take 1 tablet by mouth daily (BEFORE BREAKFAST), Normal, Disp-90 Tab, R-0      losartan (COZAAR) 100 mg tablet take 1 tablet by mouth once daily, Normal, Disp-90 Tab, R-0      Omeprazole delayed release (PRILOSEC D/R) 20 mg tablet Take 1 Tab by mouth two (2) times a day., Normal, Disp-180 Tab, R-1      buPROPion XL (WELLBUTRIN XL) 150 mg tablet Take 150 mg by mouth every morning., Historical Med      dextroamphetamine-amphetamine (ADDERALL) 20 mg tablet Take 20 mg by mouth two (2) times a day., Historical Med      amitriptyline (ELAVIL) 150 mg tablet Historical Med, R-0      polyethylene glycol (MIRALAX) 17 gram packet Take 1 Packet by mouth daily. , Normal, Disp-14 Each, R-5      HYDROmorphone (DILAUDID) 8 mg tablet Take 8 mg by mouth every four (4) hours as needed for Pain., Historical Med      clonazepam (KLONOPIN) 1 mg tablet Take 1 mg by mouth three (3) times daily. , Historical Med      lamotrigine (LAMICTAL) 200 mg tablet Take 200 mg by mouth two (2) times a day., Historical Med      metFORMIN (GLUCOPHAGE) 500 mg tablet take 1 tablet by mouth twice a day with meals, Normal, Disp-60 Tab, R-3      linaclotide (LINZESS) 145 mcg cap capsule Take  by mouth nightly., Historical Med      oxyCODONE CR (OXYCONTIN) 40 mg CR tablet Take 1 Tab by mouth every six (6) hours for 30 days. For chronic pain  Fill on or after:  8/2/14  Indications: CHRONIC PAIN, SEVERE PAIN, Print, Disp-120 Tab, R-0           2. Follow-up Information     Follow up With Specialties Details Why Contact Info    Mercedez Richardson MD Internal Medicine Schedule an appointment as soon as possible for a visit in 1 day  [id number] [address] DEPT Emergency Medicine  If symptoms worsen [address]  [phone]        Return to ED if worse     Diagnosis     Clinical Impression:   1. Swelling of lower leg        Attestations:    JUSTINO Watson    Please note that this dictation was completed with i[employer]gree, the [employer] voice recognition software. Quite often unanticipated grammatical, syntax, homophones, and other interpretive errors are inadvertently transcribed by the computer software. Please disregard these errors. Please excuse any errors that have escaped final proofreading. Thank you.

## 2020-03-10 ENCOUNTER — PATIENT OUTREACH (OUTPATIENT)
Dept: FAMILY MEDICINE CLINIC | Age: 45
End: 2020-03-10

## 2020-03-10 DIAGNOSIS — I10 ESSENTIAL HYPERTENSION: ICD-10-CM

## 2020-03-10 DIAGNOSIS — E78.5 DYSLIPIDEMIA: ICD-10-CM

## 2020-03-10 DIAGNOSIS — E03.4 HYPOTHYROIDISM DUE TO ACQUIRED ATROPHY OF THYROID: ICD-10-CM

## 2020-03-10 RX ORDER — SIMVASTATIN 40 MG/1
TABLET, FILM COATED ORAL
Qty: 90 TAB | Refills: 0 | Status: SHIPPED | OUTPATIENT
Start: 2020-03-10

## 2020-03-10 RX ORDER — LOSARTAN POTASSIUM 100 MG/1
TABLET ORAL
Qty: 90 TAB | Refills: 0 | Status: SHIPPED | OUTPATIENT
Start: 2020-03-10

## 2020-03-10 RX ORDER — LEVOTHYROXINE SODIUM 50 UG/1
TABLET ORAL
Qty: 90 TAB | Refills: 0 | Status: SHIPPED | OUTPATIENT
Start: 2020-03-10

## 2020-03-10 RX ORDER — HYDROCHLOROTHIAZIDE 25 MG/1
TABLET ORAL
Qty: 90 TAB | Refills: 0 | Status: SHIPPED | OUTPATIENT
Start: 2020-03-10

## 2020-03-10 NOTE — PROGRESS NOTES
[facility] initial CCS Outreach call     Date/Time: 3/10/2020 3:57     Method of communication with patient:phone    [facility] ( [facility]) made out reach to  patient by telephone to offer Care Coordination Services ( CCS). Provided introduction to self, and explanation of the Ambulatory Care Manager's role. [facility] had to leave a voicemail message for return to  anytime Monday thru Friday 08:30-5:00.     Patient was seen in ED for bilateral lower extremity swelling  Late for 3/3/2020 appointment was not seen by provider  Follow up appointment 3/16/2020

## 2020-03-12 ENCOUNTER — HOSPITAL ENCOUNTER (EMERGENCY)
Age: 45
Discharge: STILL ADMITTED OR ADMINISTRATIVE | End: 2020-03-12
Attending: EMERGENCY MEDICINE
Payer: MEDICARE

## 2020-03-12 PROCEDURE — 75810000275 HC EMERGENCY DEPT VISIT NO LEVEL OF CARE

## 2022-03-19 PROBLEM — M25.572 CHRONIC PAIN OF LEFT ANKLE: Status: ACTIVE | Noted: 2019-03-08

## 2022-03-19 PROBLEM — I10 ESSENTIAL HYPERTENSION: Status: ACTIVE | Noted: 2017-08-23

## 2022-03-19 PROBLEM — R73.01 IMPAIRED FASTING GLUCOSE: Status: ACTIVE | Noted: 2019-03-08

## 2022-03-19 PROBLEM — G89.29 CHRONIC PAIN OF LEFT ANKLE: Status: ACTIVE | Noted: 2019-03-08

## 2024-05-28 ENCOUNTER — HOSPITAL ENCOUNTER (OUTPATIENT)
Facility: HOSPITAL | Age: 49
Setting detail: SPECIMEN
Discharge: HOME | End: 2024-05-31
Payer: MEDICARE

## 2024-05-28 PROCEDURE — 88175 CYTOPATH C/V AUTO FLUID REDO: CPT

## 2024-05-28 PROCEDURE — 87624 HPV HI-RISK TYP POOLED RSLT: CPT
